# Patient Record
Sex: MALE | Race: WHITE | Employment: OTHER | URBAN - METROPOLITAN AREA
[De-identification: names, ages, dates, MRNs, and addresses within clinical notes are randomized per-mention and may not be internally consistent; named-entity substitution may affect disease eponyms.]

---

## 2023-09-10 ENCOUNTER — APPOINTMENT (OUTPATIENT)
Dept: GENERAL RADIOLOGY | Facility: HOSPITAL | Age: 83
End: 2023-09-10
Attending: EMERGENCY MEDICINE
Payer: MEDICAID

## 2023-09-10 ENCOUNTER — APPOINTMENT (OUTPATIENT)
Dept: CT IMAGING | Facility: HOSPITAL | Age: 83
End: 2023-09-10
Attending: EMERGENCY MEDICINE
Payer: MEDICAID

## 2023-09-10 ENCOUNTER — HOSPITAL ENCOUNTER (INPATIENT)
Facility: HOSPITAL | Age: 83
LOS: 2 days | Discharge: NURSING, REHAB OR LONG-TERM CARE | End: 2023-09-12
Attending: EMERGENCY MEDICINE | Admitting: HOSPITALIST
Payer: MEDICAID

## 2023-09-10 DIAGNOSIS — E86.0 DEHYDRATION: ICD-10-CM

## 2023-09-10 DIAGNOSIS — R11.11 VOMITING WITHOUT NAUSEA, UNSPECIFIED VOMITING TYPE: Primary | ICD-10-CM

## 2023-09-10 DIAGNOSIS — R53.1 WEAKNESS: ICD-10-CM

## 2023-09-10 LAB
ALBUMIN SERPL-MCNC: 3.4 G/DL (ref 3.4–5)
ALBUMIN/GLOB SERPL: 0.9 {RATIO} (ref 1–2)
ALP LIVER SERPL-CCNC: 76 U/L
ALT SERPL-CCNC: 23 U/L
ANION GAP SERPL CALC-SCNC: 8 MMOL/L (ref 0–18)
AST SERPL-CCNC: 32 U/L (ref 15–37)
BASOPHILS # BLD AUTO: 0.03 X10(3) UL (ref 0–0.2)
BASOPHILS NFR BLD AUTO: 0.4 %
BILIRUB SERPL-MCNC: 0.6 MG/DL (ref 0.1–2)
BILIRUB UR QL STRIP.AUTO: NEGATIVE
BUN BLD-MCNC: 15 MG/DL (ref 7–18)
CALCIUM BLD-MCNC: 8.7 MG/DL (ref 8.5–10.1)
CHLORIDE SERPL-SCNC: 104 MMOL/L (ref 98–112)
CLARITY UR REFRACT.AUTO: CLEAR
CO2 SERPL-SCNC: 25 MMOL/L (ref 21–32)
CREAT BLD-MCNC: 0.98 MG/DL
EGFRCR SERPLBLD CKD-EPI 2021: 77 ML/MIN/1.73M2 (ref 60–?)
EOSINOPHIL # BLD AUTO: 0.01 X10(3) UL (ref 0–0.7)
EOSINOPHIL NFR BLD AUTO: 0.1 %
ERYTHROCYTE [DISTWIDTH] IN BLOOD BY AUTOMATED COUNT: 12.3 %
FLUAV + FLUBV RNA SPEC NAA+PROBE: NEGATIVE
FLUAV + FLUBV RNA SPEC NAA+PROBE: NEGATIVE
GLOBULIN PLAS-MCNC: 3.7 G/DL (ref 2.8–4.4)
GLUCOSE BLD-MCNC: 153 MG/DL (ref 70–99)
GLUCOSE UR STRIP.AUTO-MCNC: 500 MG/DL
HCT VFR BLD AUTO: 36.5 %
HGB BLD-MCNC: 12.7 G/DL
IMM GRANULOCYTES # BLD AUTO: 0.04 X10(3) UL (ref 0–1)
IMM GRANULOCYTES NFR BLD: 0.5 %
KETONES UR STRIP.AUTO-MCNC: NEGATIVE MG/DL
LACTATE SERPL-SCNC: 1.2 MMOL/L (ref 0.4–2)
LACTATE SERPL-SCNC: 2.2 MMOL/L (ref 0.4–2)
LEUKOCYTE ESTERASE UR QL STRIP.AUTO: NEGATIVE
LYMPHOCYTES # BLD AUTO: 1.01 X10(3) UL (ref 1–4)
LYMPHOCYTES NFR BLD AUTO: 12.3 %
MCH RBC QN AUTO: 31.6 PG (ref 26–34)
MCHC RBC AUTO-ENTMCNC: 34.8 G/DL (ref 31–37)
MCV RBC AUTO: 90.8 FL
MONOCYTES # BLD AUTO: 0.97 X10(3) UL (ref 0.1–1)
MONOCYTES NFR BLD AUTO: 11.8 %
NEUTROPHILS # BLD AUTO: 6.16 X10 (3) UL (ref 1.5–7.7)
NEUTROPHILS # BLD AUTO: 6.16 X10(3) UL (ref 1.5–7.7)
NEUTROPHILS NFR BLD AUTO: 74.9 %
NITRITE UR QL STRIP.AUTO: NEGATIVE
OSMOLALITY SERPL CALC.SUM OF ELEC: 288 MOSM/KG (ref 275–295)
PH UR STRIP.AUTO: 7 [PH] (ref 5–8)
PLATELET # BLD AUTO: 203 10(3)UL (ref 150–450)
POTASSIUM SERPL-SCNC: 3.2 MMOL/L (ref 3.5–5.1)
PROT SERPL-MCNC: 7.1 G/DL (ref 6.4–8.2)
PROT UR STRIP.AUTO-MCNC: 300 MG/DL
RBC # BLD AUTO: 4.02 X10(6)UL
RBC #/AREA URNS AUTO: >10 /HPF
RSV RNA SPEC NAA+PROBE: NEGATIVE
SARS-COV-2 RNA RESP QL NAA+PROBE: DETECTED
SODIUM SERPL-SCNC: 137 MMOL/L (ref 136–145)
SP GR UR STRIP.AUTO: 1.02 (ref 1–1.03)
UROBILINOGEN UR STRIP.AUTO-MCNC: NORMAL MG/DL
WBC # BLD AUTO: 8.2 X10(3) UL (ref 4–11)

## 2023-09-10 PROCEDURE — 99223 1ST HOSP IP/OBS HIGH 75: CPT | Performed by: HOSPITALIST

## 2023-09-10 PROCEDURE — 71045 X-RAY EXAM CHEST 1 VIEW: CPT | Performed by: EMERGENCY MEDICINE

## 2023-09-10 PROCEDURE — 70450 CT HEAD/BRAIN W/O DYE: CPT | Performed by: EMERGENCY MEDICINE

## 2023-09-10 PROCEDURE — XW033E5 INTRODUCTION OF REMDESIVIR ANTI-INFECTIVE INTO PERIPHERAL VEIN, PERCUTANEOUS APPROACH, NEW TECHNOLOGY GROUP 5: ICD-10-PCS | Performed by: STUDENT IN AN ORGANIZED HEALTH CARE EDUCATION/TRAINING PROGRAM

## 2023-09-10 RX ORDER — NIFEDIPINE 10 MG/1
10 CAPSULE ORAL 3 TIMES DAILY
COMMUNITY

## 2023-09-10 RX ORDER — NIFEDIPINE 30 MG/1
30 TABLET, EXTENDED RELEASE ORAL DAILY PRN
Status: DISCONTINUED | OUTPATIENT
Start: 2023-09-11 | End: 2023-09-10

## 2023-09-10 RX ORDER — METOCLOPRAMIDE HYDROCHLORIDE 5 MG/ML
10 INJECTION INTRAMUSCULAR; INTRAVENOUS EVERY 8 HOURS PRN
Status: DISCONTINUED | OUTPATIENT
Start: 2023-09-10 | End: 2023-09-12

## 2023-09-10 RX ORDER — POLYETHYLENE GLYCOL 3350 17 G/17G
17 POWDER, FOR SOLUTION ORAL DAILY
COMMUNITY

## 2023-09-10 RX ORDER — SODIUM CHLORIDE 9 MG/ML
INJECTION, SOLUTION INTRAVENOUS CONTINUOUS
Status: DISCONTINUED | OUTPATIENT
Start: 2023-09-10 | End: 2023-09-11

## 2023-09-10 RX ORDER — ONDANSETRON 2 MG/ML
4 INJECTION INTRAMUSCULAR; INTRAVENOUS EVERY 6 HOURS PRN
Status: DISCONTINUED | OUTPATIENT
Start: 2023-09-10 | End: 2023-09-12

## 2023-09-10 RX ORDER — OMEPRAZOLE 40 MG/1
40 CAPSULE, DELAYED RELEASE ORAL DAILY
COMMUNITY
Start: 2023-09-05

## 2023-09-10 RX ORDER — ONDANSETRON 2 MG/ML
4 INJECTION INTRAMUSCULAR; INTRAVENOUS ONCE
Status: DISCONTINUED | OUTPATIENT
Start: 2023-09-10 | End: 2023-09-12

## 2023-09-10 RX ORDER — CAPTOPRIL 12.5 MG/1
12.5 TABLET ORAL 3 TIMES DAILY PRN
Status: DISCONTINUED | OUTPATIENT
Start: 2023-09-11 | End: 2023-09-10

## 2023-09-10 RX ORDER — NIFEDIPINE 30 MG/1
30 TABLET, EXTENDED RELEASE ORAL DAILY
Status: DISCONTINUED | OUTPATIENT
Start: 2023-09-11 | End: 2023-09-10

## 2023-09-10 RX ORDER — ENOXAPARIN SODIUM 100 MG/ML
40 INJECTION SUBCUTANEOUS DAILY
Status: DISCONTINUED | OUTPATIENT
Start: 2023-09-10 | End: 2023-09-12

## 2023-09-10 RX ORDER — ACETAMINOPHEN 500 MG
1000 TABLET ORAL ONCE
Status: COMPLETED | OUTPATIENT
Start: 2023-09-10 | End: 2023-09-10

## 2023-09-10 RX ORDER — ABIRATERONE ACETATE 250 MG/1
1000 TABLET ORAL DAILY
COMMUNITY
Start: 2023-08-28

## 2023-09-10 RX ORDER — METOPROLOL SUCCINATE 25 MG/1
25 TABLET, EXTENDED RELEASE ORAL DAILY
Status: DISCONTINUED | OUTPATIENT
Start: 2023-09-11 | End: 2023-09-10

## 2023-09-10 RX ORDER — NIFEDIPINE 30 MG/1
30 TABLET, FILM COATED, EXTENDED RELEASE ORAL DAILY
COMMUNITY
Start: 2023-09-06

## 2023-09-10 RX ORDER — ACETAMINOPHEN 500 MG
500 TABLET ORAL EVERY 4 HOURS PRN
Status: DISCONTINUED | OUTPATIENT
Start: 2023-09-10 | End: 2023-09-12

## 2023-09-10 RX ORDER — METOPROLOL SUCCINATE 50 MG/1
50 TABLET, EXTENDED RELEASE ORAL
Status: DISCONTINUED | OUTPATIENT
Start: 2023-09-11 | End: 2023-09-10

## 2023-09-10 RX ORDER — METOPROLOL SUCCINATE 25 MG/1
25 TABLET, EXTENDED RELEASE ORAL DAILY
COMMUNITY

## 2023-09-10 RX ORDER — POTASSIUM CHLORIDE 20 MEQ/1
40 TABLET, EXTENDED RELEASE ORAL ONCE
Status: COMPLETED | OUTPATIENT
Start: 2023-09-10 | End: 2023-09-10

## 2023-09-10 RX ORDER — PANTOPRAZOLE SODIUM 40 MG/1
40 TABLET, DELAYED RELEASE ORAL
Status: DISCONTINUED | OUTPATIENT
Start: 2023-09-11 | End: 2023-09-12

## 2023-09-10 RX ORDER — VALSARTAN 320 MG/1
160 TABLET ORAL DAILY
Status: DISCONTINUED | OUTPATIENT
Start: 2023-09-11 | End: 2023-09-10

## 2023-09-10 RX ORDER — METOPROLOL SUCCINATE 50 MG/1
50 TABLET, EXTENDED RELEASE ORAL DAILY
COMMUNITY
Start: 2023-04-19

## 2023-09-11 LAB
ANION GAP SERPL CALC-SCNC: 5 MMOL/L (ref 0–18)
BUN BLD-MCNC: 14 MG/DL (ref 7–18)
CALCIUM BLD-MCNC: 7.7 MG/DL (ref 8.5–10.1)
CHLORIDE SERPL-SCNC: 112 MMOL/L (ref 98–112)
CO2 SERPL-SCNC: 25 MMOL/L (ref 21–32)
CREAT BLD-MCNC: 0.71 MG/DL
EGFRCR SERPLBLD CKD-EPI 2021: 91 ML/MIN/1.73M2 (ref 60–?)
ERYTHROCYTE [DISTWIDTH] IN BLOOD BY AUTOMATED COUNT: 12.4 %
GLUCOSE BLD-MCNC: 96 MG/DL (ref 70–99)
HCT VFR BLD AUTO: 34.5 %
HGB BLD-MCNC: 11.6 G/DL
MAGNESIUM SERPL-MCNC: 1.9 MG/DL (ref 1.6–2.6)
MCH RBC QN AUTO: 31.5 PG (ref 26–34)
MCHC RBC AUTO-ENTMCNC: 33.6 G/DL (ref 31–37)
MCV RBC AUTO: 93.8 FL
OSMOLALITY SERPL CALC.SUM OF ELEC: 294 MOSM/KG (ref 275–295)
PLATELET # BLD AUTO: 140 10(3)UL (ref 150–450)
POTASSIUM SERPL-SCNC: 2.9 MMOL/L (ref 3.5–5.1)
POTASSIUM SERPL-SCNC: 2.9 MMOL/L (ref 3.5–5.1)
POTASSIUM SERPL-SCNC: 3.4 MMOL/L (ref 3.5–5.1)
RBC # BLD AUTO: 3.68 X10(6)UL
SODIUM SERPL-SCNC: 142 MMOL/L (ref 136–145)
WBC # BLD AUTO: 4.3 X10(3) UL (ref 4–11)

## 2023-09-11 PROCEDURE — 99233 SBSQ HOSP IP/OBS HIGH 50: CPT | Performed by: HOSPITALIST

## 2023-09-11 RX ORDER — HYDRALAZINE HYDROCHLORIDE 20 MG/ML
10 INJECTION INTRAMUSCULAR; INTRAVENOUS EVERY 6 HOURS PRN
Status: DISCONTINUED | OUTPATIENT
Start: 2023-09-11 | End: 2023-09-12

## 2023-09-11 RX ORDER — NIFEDIPINE 30 MG/1
30 TABLET, EXTENDED RELEASE ORAL DAILY
Status: DISCONTINUED | OUTPATIENT
Start: 2023-09-11 | End: 2023-09-12

## 2023-09-11 RX ORDER — LOSARTAN POTASSIUM 100 MG/1
100 TABLET ORAL DAILY
Status: DISCONTINUED | OUTPATIENT
Start: 2023-09-11 | End: 2023-09-12

## 2023-09-11 RX ORDER — POTASSIUM CHLORIDE 20 MEQ/1
40 TABLET, EXTENDED RELEASE ORAL EVERY 4 HOURS
Status: COMPLETED | OUTPATIENT
Start: 2023-09-11 | End: 2023-09-11

## 2023-09-11 RX ORDER — METOPROLOL TARTRATE 50 MG/1
50 TABLET, FILM COATED ORAL
Status: DISCONTINUED | OUTPATIENT
Start: 2023-09-11 | End: 2023-09-12

## 2023-09-11 RX ORDER — POTASSIUM CHLORIDE 20 MEQ/1
40 TABLET, EXTENDED RELEASE ORAL ONCE
Status: COMPLETED | OUTPATIENT
Start: 2023-09-11 | End: 2023-09-11

## 2023-09-12 VITALS
HEART RATE: 93 BPM | OXYGEN SATURATION: 96 % | DIASTOLIC BLOOD PRESSURE: 107 MMHG | WEIGHT: 185 LBS | RESPIRATION RATE: 18 BRPM | SYSTOLIC BLOOD PRESSURE: 141 MMHG | HEIGHT: 66.93 IN | BODY MASS INDEX: 29.03 KG/M2 | TEMPERATURE: 99 F

## 2023-09-12 LAB
ANION GAP SERPL CALC-SCNC: 4 MMOL/L (ref 0–18)
BASOPHILS # BLD AUTO: 0.02 X10(3) UL (ref 0–0.2)
BASOPHILS NFR BLD AUTO: 0.5 %
BUN BLD-MCNC: 16 MG/DL (ref 7–18)
CALCIUM BLD-MCNC: 8 MG/DL (ref 8.5–10.1)
CHLORIDE SERPL-SCNC: 115 MMOL/L (ref 98–112)
CO2 SERPL-SCNC: 24 MMOL/L (ref 21–32)
CREAT BLD-MCNC: 0.79 MG/DL
EGFRCR SERPLBLD CKD-EPI 2021: 88 ML/MIN/1.73M2 (ref 60–?)
EOSINOPHIL # BLD AUTO: 0.02 X10(3) UL (ref 0–0.7)
EOSINOPHIL NFR BLD AUTO: 0.5 %
ERYTHROCYTE [DISTWIDTH] IN BLOOD BY AUTOMATED COUNT: 12.5 %
GLUCOSE BLD-MCNC: 101 MG/DL (ref 70–99)
HCT VFR BLD AUTO: 32.5 %
HGB BLD-MCNC: 11.4 G/DL
IMM GRANULOCYTES # BLD AUTO: 0.01 X10(3) UL (ref 0–1)
IMM GRANULOCYTES NFR BLD: 0.3 %
LYMPHOCYTES # BLD AUTO: 1.53 X10(3) UL (ref 1–4)
LYMPHOCYTES NFR BLD AUTO: 40.2 %
MAGNESIUM SERPL-MCNC: 2.1 MG/DL (ref 1.6–2.6)
MCH RBC QN AUTO: 31.7 PG (ref 26–34)
MCHC RBC AUTO-ENTMCNC: 35.1 G/DL (ref 31–37)
MCV RBC AUTO: 90.3 FL
MONOCYTES # BLD AUTO: 0.46 X10(3) UL (ref 0.1–1)
MONOCYTES NFR BLD AUTO: 12.1 %
NEUTROPHILS # BLD AUTO: 1.77 X10 (3) UL (ref 1.5–7.7)
NEUTROPHILS # BLD AUTO: 1.77 X10(3) UL (ref 1.5–7.7)
NEUTROPHILS NFR BLD AUTO: 46.4 %
OSMOLALITY SERPL CALC.SUM OF ELEC: 297 MOSM/KG (ref 275–295)
PLATELET # BLD AUTO: 141 10(3)UL (ref 150–450)
POTASSIUM SERPL-SCNC: 3.3 MMOL/L (ref 3.5–5.1)
POTASSIUM SERPL-SCNC: 3.3 MMOL/L (ref 3.5–5.1)
RBC # BLD AUTO: 3.6 X10(6)UL
SODIUM SERPL-SCNC: 143 MMOL/L (ref 136–145)
WBC # BLD AUTO: 3.8 X10(3) UL (ref 4–11)

## 2023-09-12 PROCEDURE — 99239 HOSP IP/OBS DSCHRG MGMT >30: CPT | Performed by: HOSPITALIST

## 2023-09-12 RX ORDER — POTASSIUM CHLORIDE 20 MEQ/1
40 TABLET, EXTENDED RELEASE ORAL ONCE
Status: COMPLETED | OUTPATIENT
Start: 2023-09-12 | End: 2023-09-12

## 2024-10-14 ENCOUNTER — APPOINTMENT (OUTPATIENT)
Dept: CT IMAGING | Facility: HOSPITAL | Age: 84
End: 2024-10-14
Attending: EMERGENCY MEDICINE
Payer: MEDICAID

## 2024-10-14 ENCOUNTER — HOSPITAL ENCOUNTER (INPATIENT)
Facility: HOSPITAL | Age: 84
LOS: 2 days | Discharge: HOME | End: 2024-10-16
Attending: EMERGENCY MEDICINE | Admitting: HOSPITALIST
Payer: MEDICAID

## 2024-10-14 ENCOUNTER — APPOINTMENT (OUTPATIENT)
Dept: GENERAL RADIOLOGY | Facility: HOSPITAL | Age: 84
End: 2024-10-14
Attending: INTERNAL MEDICINE
Payer: MEDICAID

## 2024-10-14 DIAGNOSIS — R07.9 CHEST PAIN OF UNCERTAIN ETIOLOGY: Primary | ICD-10-CM

## 2024-10-14 DIAGNOSIS — I16.1 HYPERTENSIVE EMERGENCY: ICD-10-CM

## 2024-10-14 PROBLEM — R73.9 HYPERGLYCEMIA: Status: ACTIVE | Noted: 2024-10-14

## 2024-10-14 PROBLEM — E87.6 HYPOKALEMIA: Status: ACTIVE | Noted: 2024-10-14

## 2024-10-14 LAB
ALBUMIN SERPL-MCNC: 4.3 G/DL (ref 3.2–4.8)
ALBUMIN/GLOB SERPL: 1.7 {RATIO} (ref 1–2)
ALP LIVER SERPL-CCNC: 94 U/L
ALT SERPL-CCNC: 7 U/L
ANION GAP SERPL CALC-SCNC: 6 MMOL/L (ref 0–18)
AST SERPL-CCNC: 24 U/L (ref ?–34)
ATRIAL RATE: 63 BPM
BASOPHILS # BLD AUTO: 0.06 X10(3) UL (ref 0–0.2)
BASOPHILS NFR BLD AUTO: 0.8 %
BILIRUB SERPL-MCNC: 0.3 MG/DL (ref 0.2–1.1)
BUN BLD-MCNC: 17 MG/DL (ref 9–23)
CALCIUM BLD-MCNC: 9.1 MG/DL (ref 8.7–10.4)
CHLORIDE SERPL-SCNC: 108 MMOL/L (ref 98–112)
CO2 SERPL-SCNC: 26 MMOL/L (ref 21–32)
CREAT BLD-MCNC: 0.93 MG/DL
EGFRCR SERPLBLD CKD-EPI 2021: 81 ML/MIN/1.73M2 (ref 60–?)
EOSINOPHIL # BLD AUTO: 0.15 X10(3) UL (ref 0–0.7)
EOSINOPHIL NFR BLD AUTO: 2 %
ERYTHROCYTE [DISTWIDTH] IN BLOOD BY AUTOMATED COUNT: 12.6 %
GLOBULIN PLAS-MCNC: 2.6 G/DL (ref 2–3.5)
GLUCOSE BLD-MCNC: 129 MG/DL (ref 70–99)
HCT VFR BLD AUTO: 34.9 %
HGB BLD-MCNC: 12.2 G/DL
IMM GRANULOCYTES # BLD AUTO: 0.04 X10(3) UL (ref 0–1)
IMM GRANULOCYTES NFR BLD: 0.5 %
LYMPHOCYTES # BLD AUTO: 1.68 X10(3) UL (ref 1–4)
LYMPHOCYTES NFR BLD AUTO: 22.9 %
MCH RBC QN AUTO: 31.5 PG (ref 26–34)
MCHC RBC AUTO-ENTMCNC: 35 G/DL (ref 31–37)
MCV RBC AUTO: 90.2 FL
MONOCYTES # BLD AUTO: 0.36 X10(3) UL (ref 0.1–1)
MONOCYTES NFR BLD AUTO: 4.9 %
NEUTROPHILS # BLD AUTO: 5.04 X10 (3) UL (ref 1.5–7.7)
NEUTROPHILS # BLD AUTO: 5.04 X10(3) UL (ref 1.5–7.7)
NEUTROPHILS NFR BLD AUTO: 68.9 %
OSMOLALITY SERPL CALC.SUM OF ELEC: 293 MOSM/KG (ref 275–295)
P AXIS: 57 DEGREES
P-R INTERVAL: 182 MS
PLATELET # BLD AUTO: 231 10(3)UL (ref 150–450)
POTASSIUM SERPL-SCNC: 3.4 MMOL/L (ref 3.5–5.1)
PROT SERPL-MCNC: 6.9 G/DL (ref 5.7–8.2)
Q-T INTERVAL: 402 MS
QRS DURATION: 80 MS
QTC CALCULATION (BEZET): 411 MS
R AXIS: 40 DEGREES
RBC # BLD AUTO: 3.87 X10(6)UL
SODIUM SERPL-SCNC: 140 MMOL/L (ref 136–145)
T AXIS: 51 DEGREES
TROPONIN I SERPL HS-MCNC: 12 NG/L
TROPONIN I SERPL HS-MCNC: 12 NG/L
TSI SER-ACNC: 1.47 MIU/ML (ref 0.55–4.78)
VENTRICULAR RATE: 63 BPM
WBC # BLD AUTO: 7.3 X10(3) UL (ref 4–11)

## 2024-10-14 PROCEDURE — 70450 CT HEAD/BRAIN W/O DYE: CPT | Performed by: EMERGENCY MEDICINE

## 2024-10-14 PROCEDURE — 99223 1ST HOSP IP/OBS HIGH 75: CPT | Performed by: INTERNAL MEDICINE

## 2024-10-14 PROCEDURE — 71045 X-RAY EXAM CHEST 1 VIEW: CPT | Performed by: INTERNAL MEDICINE

## 2024-10-14 RX ORDER — BISACODYL 10 MG
10 SUPPOSITORY, RECTAL RECTAL
Status: DISCONTINUED | OUTPATIENT
Start: 2024-10-14 | End: 2024-10-16

## 2024-10-14 RX ORDER — NITROGLYCERIN 0.4 MG/1
TABLET SUBLINGUAL
Status: COMPLETED
Start: 2024-10-14 | End: 2024-10-14

## 2024-10-14 RX ORDER — NITROGLYCERIN 20 MG/100ML
INJECTION INTRAVENOUS CONTINUOUS
Status: DISCONTINUED | OUTPATIENT
Start: 2024-10-14 | End: 2024-10-15

## 2024-10-14 RX ORDER — HYDRALAZINE HYDROCHLORIDE 20 MG/ML
10 INJECTION INTRAMUSCULAR; INTRAVENOUS ONCE
Status: COMPLETED | OUTPATIENT
Start: 2024-10-14 | End: 2024-10-14

## 2024-10-14 RX ORDER — ENOXAPARIN SODIUM 100 MG/ML
40 INJECTION SUBCUTANEOUS DAILY
Status: DISCONTINUED | OUTPATIENT
Start: 2024-10-14 | End: 2024-10-16

## 2024-10-14 RX ORDER — POTASSIUM CHLORIDE 1500 MG/1
40 TABLET, EXTENDED RELEASE ORAL ONCE
Status: COMPLETED | OUTPATIENT
Start: 2024-10-14 | End: 2024-10-14

## 2024-10-14 RX ORDER — METOCLOPRAMIDE HYDROCHLORIDE 5 MG/ML
5 INJECTION INTRAMUSCULAR; INTRAVENOUS EVERY 8 HOURS PRN
Status: DISCONTINUED | OUTPATIENT
Start: 2024-10-14 | End: 2024-10-16

## 2024-10-14 RX ORDER — ZOLPIDEM TARTRATE 5 MG/1
10 TABLET ORAL NIGHTLY PRN
Status: DISCONTINUED | OUTPATIENT
Start: 2024-10-14 | End: 2024-10-16

## 2024-10-14 RX ORDER — ZOLPIDEM TARTRATE 10 MG/1
10 TABLET ORAL NIGHTLY PRN
COMMUNITY

## 2024-10-14 RX ORDER — SENNOSIDES 8.6 MG
17.2 TABLET ORAL NIGHTLY PRN
Status: DISCONTINUED | OUTPATIENT
Start: 2024-10-14 | End: 2024-10-16

## 2024-10-14 RX ORDER — PREDNISONE 5 MG/1
5 TABLET ORAL DAILY
Status: DISCONTINUED | OUTPATIENT
Start: 2024-10-15 | End: 2024-10-16

## 2024-10-14 RX ORDER — SODIUM PHOSPHATE, DIBASIC AND SODIUM PHOSPHATE, MONOBASIC 7; 19 G/230ML; G/230ML
1 ENEMA RECTAL ONCE AS NEEDED
Status: DISCONTINUED | OUTPATIENT
Start: 2024-10-14 | End: 2024-10-16

## 2024-10-14 RX ORDER — ABIRATERONE ACETATE 250 MG/1
1000 TABLET ORAL DAILY
Status: DISCONTINUED | OUTPATIENT
Start: 2024-10-15 | End: 2024-10-16

## 2024-10-14 RX ORDER — ACETAMINOPHEN 500 MG
500 TABLET ORAL EVERY 4 HOURS PRN
Status: DISCONTINUED | OUTPATIENT
Start: 2024-10-14 | End: 2024-10-16

## 2024-10-14 RX ORDER — ASPIRIN 81 MG/1
324 TABLET, CHEWABLE ORAL ONCE
Status: COMPLETED | OUTPATIENT
Start: 2024-10-14 | End: 2024-10-14

## 2024-10-14 RX ORDER — PREDNISONE 5 MG/1
5 TABLET ORAL DAILY
COMMUNITY

## 2024-10-14 RX ORDER — PREDNISONE 5 MG/1
5 TABLET ORAL EVERY EVENING
Status: DISCONTINUED | OUTPATIENT
Start: 2024-10-14 | End: 2024-10-14

## 2024-10-14 RX ORDER — HYDRALAZINE HYDROCHLORIDE 20 MG/ML
10 INJECTION INTRAMUSCULAR; INTRAVENOUS ONCE
Status: DISCONTINUED | OUTPATIENT
Start: 2024-10-14 | End: 2024-10-14

## 2024-10-14 RX ORDER — POLYETHYLENE GLYCOL 3350 17 G/17G
17 POWDER, FOR SOLUTION ORAL DAILY PRN
Status: DISCONTINUED | OUTPATIENT
Start: 2024-10-14 | End: 2024-10-16

## 2024-10-14 RX ORDER — MELATONIN
3 NIGHTLY PRN
Status: DISCONTINUED | OUTPATIENT
Start: 2024-10-14 | End: 2024-10-16

## 2024-10-14 RX ORDER — AMLODIPINE BESYLATE 5 MG/1
5 TABLET ORAL DAILY
Status: DISCONTINUED | OUTPATIENT
Start: 2024-10-14 | End: 2024-10-16

## 2024-10-14 RX ORDER — ONDANSETRON 2 MG/ML
4 INJECTION INTRAMUSCULAR; INTRAVENOUS EVERY 6 HOURS PRN
Status: DISCONTINUED | OUTPATIENT
Start: 2024-10-14 | End: 2024-10-16

## 2024-10-14 RX ORDER — METOPROLOL SUCCINATE 50 MG/1
50 TABLET, EXTENDED RELEASE ORAL DAILY
Status: DISCONTINUED | OUTPATIENT
Start: 2024-10-15 | End: 2024-10-16

## 2024-10-14 RX ORDER — NITROGLYCERIN 0.4 MG/1
0.4 TABLET SUBLINGUAL ONCE
Status: COMPLETED | OUTPATIENT
Start: 2024-10-14 | End: 2024-10-14

## 2024-10-14 NOTE — PLAN OF CARE
Patient was an admit from the ED. He is mandarin speaking. Son and  used to translate. He is a/o x3. Denies any chest pain or shortness of breath. On RA. Cardiac wise he is NSR. On a nitroglycerin drip. Continent. Urinal at bedside. Up x1 with a walker.

## 2024-10-14 NOTE — CONSULTS
[facility]  Cardiology Consultation    Shreya Avelar Patient Status:  Emergency    1940 MRN [id number]   Location [facility] EMERGENCY DEPARTMENT Attending Lars Chapman MD   Hosp Day # 0 PCP LISA Mohamud     Reason for Consultation:  Chest pain    History of Present Illness:  Shreya Avelar is a a(n) 84 year old male who presents with confusion, chest pain.  He is mandarin speaking  Son translating  He has noted elevated high blood pressure for past few months  He has chest pain for 2 days. Has been having pain at home since mid night last night.   Has not been compliant with his medications. Partly because of his insurance.    Has no current chest pain or pressure. No SOB    States his pain is somewhat similar to what he had back when he had his MI    He denies smoking or alcohol intake    He has a remote history of PCI more than 10 years ago. He also has a history of metastatic prostate cancer - follows at Counts include [facility] beds at the [facility].     EKG: NSR, septal infarct  Trop negative x 1    History:  Past Medical History:    BPH (benign prostatic hyperplasia)    Cancer (HCC)    prostate    Essential hypertension    High blood pressure     History reviewed. No pertinent surgical history.  No family history on file.   reports that he has never smoked. He has never used smokeless tobacco. He reports that he does not drink alcohol and does not use drugs.    Allergies:  Allergies[1]    Medications:    Current Facility-Administered Medications:     nitroGLYCERIN in dextrose 5% 50 mg/250mL infusion premix, 5-400 mcg/min, Intravenous, Continuous    Review of Systems:  A comprehensive review of systems was negative if not otherwise mention in above HPI.    BP (!) 201/81   Pulse 63   Temp 97.9 °F (36.6 °C) (Oral)   Resp 18   Ht 5' 6\" (1.676 m)   Wt 186 lb (84.4 kg)   SpO2 99%   BMI 30.02 kg/m²   Temp (24hrs), Av.9 °F (36.6 °C), Min:97.9 °F (36.6 °C), Max:97.9 °F (36.6 °C)     No intake  or output data in the 24 hours ending 10/14/24 1526  Wt Readings from Last 3 Encounters:   10/14/24 186 lb (84.4 kg)   09/10/23 185 lb (83.9 kg)       Physical Exam:   General: Alert and oriented x 3. No apparent distress. No respiratory or constitutional distress.  HEENT: Normocephalic, anicteric sclera, neck supple.  Neck: No JVD,   Cardiac: Regular rate and rhythm. S1, S2 normal. No murmur,  Lungs: Clear without wheezes, rales, rhonchi or dullness.   Abdomen: Soft, non-tender.   Extremities: Without clubbing, cyanosis or edema.    Neurologic: Alert and oriented, normal affect.  Skin: Warm and dry.     Laboratory Data:  Lab Results   Component Value Date    WBC 7.3 10/14/2024    HGB 12.2 10/14/2024    HCT 34.9 10/14/2024    .0 10/14/2024    CREATSERUM 0.93 10/14/2024    BUN 17 10/14/2024     10/14/2024    K 3.4 10/14/2024     10/14/2024    CO2 26.0 10/14/2024     10/14/2024    CA 9.1 10/14/2024    ALB 4.3 10/14/2024    ALKPHO 94 10/14/2024    BILT 0.3 10/14/2024    TP 6.9 10/14/2024    AST 24 10/14/2024    ALT 7 10/14/2024     Recent Labs   Lab 10/14/24  1326   TROPHS 12         Imaging:  Reviewed    Impression:    Hypertensive urgency  Chest pain  History of CAD s/p PCI to left circumflex  Metastatic prostate cancer - under control per family      Recommendations:    He has ruled out for ACS  Will control BP. Add amlodipine 5 mg daily  Cont with metoprolol    Will check echo    Eventual stress test prior to discharge for evaluation of underlying CAD    Thank you for allowing me to participate in the care of your patient.    Amina Singh MD  10/14/2024  3:26 PM         [1] No Known Allergies

## 2024-10-14 NOTE — ED INITIAL ASSESSMENT (HPI)
Patient here with son for nausea and confusion overnight. This morning he started experiencing chest pain.   Hx Htn, MI with stents placed, along with prostate CA.

## 2024-10-14 NOTE — ED QUICK NOTES
Orders for admission, patient is aware of plan and ready to go upstairs. Any questions, please call ED ANGELITO Mora at extension [phone].     Patient Covid vaccination status: Fully vaccinated     COVID Test Ordered in ED: None    COVID Suspicion at Admission: N/A    Running Infusions:    nitroGLYCERIN in dextrose 5% 20 mcg/min (10/14/24 1433)        Mental Status/LOC at time of transport: x4    Other pertinent information: Mandarin speaking  CIWA score: N/A   NIH score:  N/A

## 2024-10-14 NOTE — H&P
[facility]     Shreya Avelar Patient Status:  Emergency    1940 MRN [id number]   [facility] EMERGENCY DEPARTMENT Attending Lars Chapman MD   Hosp Day # 0 PCP LISA Mohamud     Chief Complaint: chest pain, nausea, altered mental status    Subjective:    History of Present Illness:     Shreya Avelar is a 84 year old male with history of CAD s/p PCI, HTN, prostate cancer, Parkinson's disease presented with chest pain, nausea, AMS.     Patient is Mandarin speaking and his son helped translate and also provided history.  Patient has had elevated blood pressures up into the systolics of 200s for weeks to months.  Primary care physician recommended for better blood pressure control about 2 weeks ago.  It is not clear if any medication changes were made at the time.  Patient has been reporting intermittent chest pain for months.  Current episode started at midnight on day of admission.  Patient reports a substernal tightness/pressure that was constant.  Blood pressure is elevated up to 200 and persisted so family decided to bring the patient to the ER for evaluation.  Patient reports associated nausea and chills.  Denies fevers, dysuria, hematuria, cough, weakness, tingling, headache.    BP up to 207/77 in the ED. EKG with NSR and no significant ST-T wave changes c/f ACS. Troponin x 1 negative. Patient was given FD aspirin, IV hydralazine 10 mg, SL nitroglycerin and nitroglycerin drip. Cardiology consulted.     History/Other:    Past Medical History:  Past Medical History:    BPH (benign prostatic hyperplasia)    Cancer (HCC)    prostate    Essential hypertension    High blood pressure     Past Surgical History:   History reviewed. No pertinent surgical history.   Family History:   No family history on file.  Social History:    reports that he has never smoked. He has never used smokeless tobacco. He reports that he does not drink alcohol and does not use  drugs.     Allergies: Allergies[1]    Medications:  Medications Ordered Prior to Encounter[2]    Review of Systems:   A comprehensive review of systems was completed.    Pertinent positives and negatives noted in the HPI.    Objective:   Physical Exam:    BP (!) 196/78   Pulse 68   Temp 97.9 °F (36.6 °C) (Oral)   Resp 17   Ht 5' 6\" (1.676 m)   Wt 186 lb (84.4 kg)   SpO2 99%   BMI 30.02 kg/m²   General: No acute distress, Alert  Respiratory: No rhonchi, no wheezes  Cardiovascular: S1, S2. Regular rate and rhythm  Abdomen: Soft, Non-tender, non-distended, positive bowel sounds  Neuro: No new focal deficits  Extremities: No edema mental status back to baseline per patient's son Darion    Results:    Labs:      Labs Last 24 Hours:    Recent Labs   Lab 10/14/24  1326   RBC 3.87   HGB 12.2*   HCT 34.9*   MCV 90.2   MCH 31.5   MCHC 35.0   RDW 12.6   NEPRELIM 5.04   WBC 7.3   .0       Recent Labs   Lab 10/14/24  1326   *   BUN 17   CREATSERUM 0.93   EGFRCR 81   CA 9.1   ALB 4.3      K 3.4*      CO2 26.0   ALKPHO 94   AST 24   ALT 7*   BILT 0.3   TP 6.9       No results found for: \"PT\", \"INR\"    Recent Labs   Lab 10/14/24  1326   TROPHS 12       No results for input(s): \"TROP\", \"PBNP\" in the last 168 hours.    No results for input(s): \"PCT\" in the last 168 hours.    Imaging: Imaging data reviewed in Epic.    Assessment & Plan:      #Chest pain   #Hypertensive crisis   -Obtain CXR  -Repeat troponin, check TSH   -Continue nitroglycerin drip, starting Norvasc  -Continue home medications  -Cardiology consulted   -Plan for echo and stress test once blood pressures are better controlled    #Altered mental status reported by family   CT brain without acute findings   -Mental status is currently back to baseline per patient's son    #Mild hypokalemia - 2/2 vomiting?  -Supplement per protocol     #CAD with hx of MI  # Prostate cancer-continue home meds  #Parkinson's disease-not on meds  #Obesity, BMI  30    CODE STATUS confirmed DNAR    Plan of care discussed with patient    Kimberlee Malin MD    Supplementary Documentation:     The 21st Century Cures Act makes medical notes like these available to patients in the interest of transparency. Please be advised this is a medical document. Medical documents are intended to carry relevant information, facts as evident, and the clinical opinion of the practitioner. The medical note is intended as peer to peer communication and may appear blunt or direct. It is written in medical language and may contain abbreviations or verbiage that are unfamiliar.                                       [1] No Known Allergies  [2]   No current facility-administered medications on file prior to encounter.     Current Outpatient Medications on File Prior to Encounter   Medication Sig Dispense Refill    Abiraterone Acetate 250 MG Oral Tab Take 1,000 mg by mouth daily.      metoprolol succinate ER 50 MG Oral Tablet 24 Hr Take 1 tablet (50 mg total) by mouth daily.      NIFEdipine ER 30 MG Oral Tablet 24 Hr Take 1 tablet (30 mg total) by mouth daily.

## 2024-10-15 ENCOUNTER — APPOINTMENT (OUTPATIENT)
Dept: CV DIAGNOSTICS | Facility: HOSPITAL | Age: 84
End: 2024-10-15
Payer: MEDICAID

## 2024-10-15 PROBLEM — C79.51 PROSTATE CANCER METASTATIC TO BONE (HCC): Status: ACTIVE | Noted: 2024-10-15

## 2024-10-15 PROBLEM — C61 PROSTATE CANCER METASTATIC TO BONE (HCC): Status: ACTIVE | Noted: 2024-10-15

## 2024-10-15 LAB
ALBUMIN SERPL-MCNC: 3.6 G/DL (ref 3.2–4.8)
ALBUMIN/GLOB SERPL: 1.6 {RATIO} (ref 1–2)
ALP LIVER SERPL-CCNC: 81 U/L
ALT SERPL-CCNC: <7 U/L
ANION GAP SERPL CALC-SCNC: 6 MMOL/L (ref 0–18)
AST SERPL-CCNC: 24 U/L (ref ?–34)
BILIRUB SERPL-MCNC: 0.5 MG/DL (ref 0.2–1.1)
BUN BLD-MCNC: 16 MG/DL (ref 9–23)
CALCIUM BLD-MCNC: 9.1 MG/DL (ref 8.7–10.4)
CHLORIDE SERPL-SCNC: 111 MMOL/L (ref 98–112)
CO2 SERPL-SCNC: 26 MMOL/L (ref 21–32)
CREAT BLD-MCNC: 0.75 MG/DL
EGFRCR SERPLBLD CKD-EPI 2021: 89 ML/MIN/1.73M2 (ref 60–?)
ERYTHROCYTE [DISTWIDTH] IN BLOOD BY AUTOMATED COUNT: 12.6 %
GLOBULIN PLAS-MCNC: 2.3 G/DL (ref 2–3.5)
GLUCOSE BLD-MCNC: 117 MG/DL (ref 70–99)
HCT VFR BLD AUTO: 31.5 %
HGB BLD-MCNC: 11 G/DL
MAGNESIUM SERPL-MCNC: 2 MG/DL (ref 1.6–2.6)
MCH RBC QN AUTO: 31.6 PG (ref 26–34)
MCHC RBC AUTO-ENTMCNC: 34.9 G/DL (ref 31–37)
MCV RBC AUTO: 90.5 FL
OSMOLALITY SERPL CALC.SUM OF ELEC: 298 MOSM/KG (ref 275–295)
PHOSPHATE SERPL-MCNC: 2.9 MG/DL (ref 2.4–5.1)
PLATELET # BLD AUTO: 202 10(3)UL (ref 150–450)
POTASSIUM SERPL-SCNC: 3.3 MMOL/L (ref 3.5–5.1)
POTASSIUM SERPL-SCNC: 3.3 MMOL/L (ref 3.5–5.1)
PROT SERPL-MCNC: 5.9 G/DL (ref 5.7–8.2)
PSA SERPL-MCNC: 26.83 NG/ML (ref ?–4)
RBC # BLD AUTO: 3.48 X10(6)UL
SODIUM SERPL-SCNC: 143 MMOL/L (ref 136–145)
WBC # BLD AUTO: 7.7 X10(3) UL (ref 4–11)

## 2024-10-15 PROCEDURE — 99255 IP/OBS CONSLTJ NEW/EST HI 80: CPT | Performed by: INTERNAL MEDICINE

## 2024-10-15 PROCEDURE — 99232 SBSQ HOSP IP/OBS MODERATE 35: CPT | Performed by: INTERNAL MEDICINE

## 2024-10-15 PROCEDURE — 93307 TTE W/O DOPPLER COMPLETE: CPT

## 2024-10-15 RX ORDER — HYDRALAZINE HYDROCHLORIDE 20 MG/ML
10 INJECTION INTRAMUSCULAR; INTRAVENOUS ONCE
Status: COMPLETED | OUTPATIENT
Start: 2024-10-15 | End: 2024-10-15

## 2024-10-15 RX ORDER — POTASSIUM CHLORIDE 1500 MG/1
40 TABLET, EXTENDED RELEASE ORAL ONCE
Status: COMPLETED | OUTPATIENT
Start: 2024-10-15 | End: 2024-10-15

## 2024-10-15 RX ORDER — LOSARTAN POTASSIUM 50 MG/1
50 TABLET ORAL DAILY
Status: DISCONTINUED | OUTPATIENT
Start: 2024-10-15 | End: 2024-10-16

## 2024-10-15 NOTE — PLAN OF CARE
Patient A&O but Mandarin speaking only.  No c/o pain, room air.  Patient's son wanted minimal visits to his dad through the night, reminded him we needed to take vitals through the night, but assured we would leave patient alone unless needed to see him.  Patient currently on Nitro gtt at 20mcg/hr, attempted to titrate lower but SBP started to increase above 160 x2 times.  Patient's oral chemo med, Abiraterone, on hold until Oncology sees patient today, son is aware and cleared with family oncologist that it is OK to miss some doses over next couple days.      BP cuff not working correctly this morning, switched cuffs and getting better readings.

## 2024-10-15 NOTE — CONSULTS
Hematology/Oncology Initial Consultation Note    Patient Name: Shreya Avelar  Medical Record Number: [id number]    YOB: 1940   Date of Consultation: 10/15/2024   Physician requesting consultation: Dr Malin    Reason for Consultation:  Shreya Avelar was seen today for the diagnosis of prostate cancer    History of Present Illness:      83 y/o M PMH CAD with hx of MI, metastatic prostate cancer who was admitted for confusion and chest pain    - prostate biopsy 12/1/2021 with grade gorup 5 prostate cancer in 6 of 12 scores, [place] 9. At that time no evidence of metastatic disease; pt opted to watch and wait  - 2/2023 bone scan with evidence of bone metastases. Started abiraterone + prednisone with lowering PSA  - however recent PSAs increasing, 0.61 in 5/13/24 to 1.8 in 8/5/24  - was reportedly confused, had chest pain, hypertensive, admitted from ED on nitroglycerin drip  - spoke to pt in Mandarin - he is completely oriented. Said he never had chest pain, had a headache at the back. Called son on speakerphone who notes his dad is back to baseline. Pt endorses \"not feeling comfortable\" at the back of his head  - has appt with Dr Garcia, his oncologist on 10/28  - says has been adherent to his abiraterone + prednisone       Past Medical History:  Past Medical History:    BPH (benign prostatic hyperplasia)    Cancer (HCC)    prostate    Essential hypertension    High blood pressure       History reviewed. No pertinent surgical history.    Home Medications:  Medications Ordered Prior to Encounter[1]    Current Inpatient Medications:  Inpatient Meds:   losartan  50 mg Oral Daily    enoxaparin  40 mg Subcutaneous Daily    amLODIPine  5 mg Oral Daily    Abiraterone Acetate  1,000 mg Oral Daily    metoprolol succinate ER  50 mg Oral Daily    predniSONE  5 mg Oral Daily      nitroGLYCERIN in dextrose 5% 20 mcg/min (10/15/24 0300)       PRN Meds:    acetaminophen    melatonin    ondansetron    metoclopramide     polyethylene glycol (PEG 3350)    sennosides    bisacodyl    fleet enema    zolpidem    Allergies:   Allergies[2]    Psychosocial History:  Social History     Social History Narrative    Not on file     Social History     Socioeconomic History    Marital status:    Tobacco Use    Smoking status: Never    Smokeless tobacco: Never   Vaping Use    Vaping status: Never Used   Substance and Sexual Activity    Alcohol use: Never    Drug use: Never     Social Drivers of Health     Food Insecurity: No Food Insecurity (10/14/2024)    Food Insecurity     Food Insecurity: Never true   Transportation Needs: No Transportation Needs (10/14/2024)    Transportation Needs     Lack of Transportation: No   Housing Stability: Low Risk  (10/14/2024)    Housing Stability     Housing Instability: No       Family Medical History:  History reviewed. No pertinent family history.    Review of Systems:  A 10-point ROS was done with pertinent positives and negative per the HPI    Vital Signs:  Height: 167.6 cm (5' 6\") (10/14 1326)  Weight: 84.4 kg (186 lb) (10/14 1749)  BSA (Calculated - sq m): 1.94 sq meters (10/14 1326)  Pulse: 72 (10/15 0700)  BP: 159/76 (10/15 0700)  Temp: 98.7 °F (37.1 °C) (10/15 0534)  Do Not Use - Resp Rate: --  SpO2: 94 % (10/15 0534)      Wt Readings from Last 6 Encounters:   10/14/24 84.4 kg (186 lb)   09/10/23 83.9 kg (185 lb)       Physical Examination:  General: Patient is alert and oriented, not in acute distress  Psych: Mood and affect are appropriate  Eyes: EOMI, PERRL  ENT: Oropharynx is clear, no adenopathy  CV: no LE edema  Respiratory: Non-labored respirations  GI/Abd: Soft, non-tender  Neurological: Grossly intact   Skin: no rashes or petechiae    Laboratory:  Recent Labs   Lab 10/14/24  1326 10/15/24  0546   WBC 7.3 7.7   HGB 12.2* 11.0*   HCT 34.9* 31.5*   .0 202.0   MCV 90.2 90.5   RDW 12.6 12.6   NEPRELIM 5.04  --        Recent Labs   Lab 10/14/24  1326 10/15/24  0546    143   K 3.4*  3.3*  3.3*    111   CO2 26.0 26.0   BUN 17 16   CREATSERUM 0.93 0.75   * 117*   CA 9.1 9.1   PHOS  --  2.9   TP 6.9 5.9   ALB 4.3 3.6   ALKPHO 94 81   AST 24 24   ALT 7* <7*   BILT 0.3 0.5       No results for input(s): \"PT\", \"INR\", \"PTT\", \"FIB\" in the last 168 hours.    Impression & Plan:     Hypertensive crisis  CAD  - BP much better controlled this AM; mgt per cardiology/primary    Confusion  - may have been due to hypertensive crisis    Metastatic prostate cancer  - restart abiraterone. Continue prednisone  - discussed with pt and his son his increase in PSA this morning to 26. Discussed likely disease progression, to follow up with his oncologist Dr Garcia about next steps    Will sign off. Pt to follow up with his outpt oncologist after discharge.    Xavier Stewart MD  Hematology/Medical Oncology  [facility]           [1]   No current facility-administered medications on file prior to encounter.     Current Outpatient Medications on File Prior to Encounter   Medication Sig Dispense Refill    zolpidem 10 MG Oral Tab Take 1 tablet (10 mg total) by mouth nightly as needed for Sleep.      TELMISARTAN OR Take 1 tablet by mouth once daily.      predniSONE 5 MG Oral Tab Take 1 tablet (5 mg total) by mouth daily.      Abiraterone Acetate 250 MG Oral Tab Take 1,000 mg by mouth daily.      metoprolol succinate ER 50 MG Oral Tablet 24 Hr Take 1 tablet (50 mg total) by mouth daily.      NIFEdipine ER 30 MG Oral Tablet 24 Hr Take 1 tablet (30 mg total) by mouth daily.     [2] No Known Allergies

## 2024-10-15 NOTE — PROGRESS NOTES
Progress Note  Shreya Avelar Patient Status:  Inpatient    1940 MRN [id number]   Location [facility] 2NE-A Attending Mickey Queen MD   Hosp Day # 1 PCP LISA Mohamud     Subjective:  Awake. Son at bedside; translating for the patient. No new CV complaints. Denies chest pain currently.     Long discussion about CAD work up and BP meds compliance.     Objective:  /66 (BP Location: Left arm)   Pulse 78   Temp 98.3 °F (36.8 °C) (Oral)   Resp 20   Ht 5' 6\" (1.676 m)   Wt 186 lb (84.4 kg)   SpO2 94%   BMI 30.02 kg/m²     Telemetry: SR       Intake/Output:    Intake/Output Summary (Last 24 hours) at 10/15/2024 09  Last data filed at 10/15/2024 0312  Gross per 24 hour   Intake 240 ml   Output 650 ml   Net -410 ml       Last 3 Weights   10/14/24 1749 186 lb (84.4 kg)   10/14/24 1326 186 lb (84.4 kg)   09/10/23 0924 185 lb (83.9 kg)       Labs:  Recent Labs   Lab 10/14/24  1326 10/15/24  0546   * 117*   BUN 17 16   CREATSERUM 0.93 0.75   EGFRCR 81 89   CA 9.1 9.1    143   K 3.4* 3.3*  3.3*    111   CO2 26.0 26.0     Recent Labs   Lab 10/14/24  1326 10/15/24  0546   RBC 3.87 3.48*   HGB 12.2* 11.0*   HCT 34.9* 31.5*   MCV 90.2 90.5   MCH 31.5 31.6   MCHC 35.0 34.9   RDW 12.6 12.6   NEPRELIM 5.04  --    WBC 7.3 7.7   .0 202.0         Recent Labs   Lab 10/14/24  1326 10/14/24  1733   TROPHS 12 12   EKG:  10/14/24:   SR 67 BPM, QRS;  80 ms, QTc: 411 ms, NH: 182 ms.   Echo:  10/15/24:  Pending.     Review of Systems:   Constitutional: No fevers, chills, fatigue or night sweats.  ENT: No mouth pain, neck pain, running nose, headaches or swollen glands.  Skin: No rashes, pruritus or skin changes,  Respiratory: Denies cough, wheezing or shortness of breath.  CV: Denies chest pain, palpitations, orthopnea, PND or dizziness.  Musculoskeletal: No joint pain, stiffness or swelling.  GI: No nausea, vomiting or diarrhea. No blood in stools.  Neurologic: No seizures,  tremors, weakness or numbness.     Physical Exam:  Gen: alert, oriented x 3, NAD  Heent: pupils equal, reactive. Mucous membranes moist.   Neck: no jvd  Cardiac: regular rate and rhythm, normal S1,S2, no murmur, gallop or rub.   Lungs: CTA  Abd: soft, NT/ND +bs  Ext: no edema  Skin: Warm, dry  Neuro: No focal deficits      Medications:     losartan  50 mg Oral Daily    potassium chloride  40 mEq Oral Once    enoxaparin  40 mg Subcutaneous Daily    amLODIPine  5 mg Oral Daily    Abiraterone Acetate  1,000 mg Oral Daily    metoprolol succinate ER  50 mg Oral Daily    predniSONE  5 mg Oral Daily      nitroGLYCERIN in dextrose 5% 20 mcg/min (10/15/24 0300)       Assessment:  Hypertensive urgency- Initially 207/77.  Bps improved with addition of amlodipine   Losartan 50 mg daily Toprol xl 50 mg daily, and amlodipine 5 mg daily   Chest pain   Trop neg.   Weaned Off nitro  Echo pending   No further anginal symptoms.   3.    History of CAD s/p PCI to left circumflex  Asa; not on statin.   4.    Metastatic prostate cancer - oncology following   prostate biopsy 12/1/2021 with grade gorup 5 prostate cancer in 6 of 12 scores, [place] 9   2/2023 bone scan with evidence of bone metastases. Started abiraterone + prednisone.  PSAs increasing, 0.61 in 5/24 > 1.8 in 8/24 > 26.83 on admission     Plan:  BP stable. Continue same BP regimen for now. Systolic 150.   Continue to monitor BP closely.   Echo today.   Stress test tomorrow for evaluation of underlying CAD   Anticipate discharge tomorrow, if stress test normal.     Plan of care discussed with patient's son and RN.      LISA Perdomo  10/15/2024  9:06 AM  [phone]    I have personally seen and examined patient.   I have independently formulated assessment and plan  I agree with the AP note    Denies CP or pressure  Discussed with son at bedside.   Will check echo today  Cont with oral regiment  Wean off nitroglycerin gtt  Plan of stress test tomorrow    Cont to  follow     Thank you for the opportunity to participate in the care of your patient.     Amina Singh MD  Interventional Cardiologist  [facility]

## 2024-10-15 NOTE — PAYOR COMM NOTE
--------------  ADMISSION REVIEW     Payor: KAITLYN  Subscriber #:  [id number]  Authorization Number: [id number]    Admit date: 10/14/24  Admit time:  4:53 PM       History   HPI  Patient is an 84-year-old male presents with his son.  He is mandrin speaking.  Offered  the son preferred to translate.  Patient has a history of high blood pressure as well as coronary disease status post stenting.  Per son and patient, patient blood pressure was quite elevated at home.  Started to have tightness in his chest.  Became somewhat confused last night.  Was diaphoretic and weak.  Similar to previous diagnosis of heart disease.  Patient has not been taking his medications secondary to financial reasons.  Blood pressure elevated on arrival here.  Complains of chest tightness.  No other specific complaints.  History of prostate cancer.  ED Triage Vitals [10/14/24 1326]   BP (!) 197/101   Pulse 63   Resp 17   Temp 97.9 °F (36.6 °C)   Temp src Oral   SpO2 97 %   O2 Device None (Room air)   HEENT: Normal cephalic atraumatic.  Nonicteric sclera.  Moist mucous membranes.  No meningismus.  No adenopathy  Lungs: No tachypnea.  Lungs clear to auscultation bilaterally without rales/rhonchi.  Equal breath sounds bilaterally  Cardiac: No tachycardia.  No murmurs.  Regular rate and rhythm.  Abdomen: Soft and nontender throughout.  No rebound or guarding  Extremities: No clubbing/cyanosis/edema.  Skin: No rashes, no pallor  Neuro: Awake oriented ×3.  Nonfocal.  Good strength throughout  Labs Reviewed   CBC WITH DIFFERENTIAL WITH PLATELET - Abnormal; Notable for the following components:       Result Value    HGB 12.2 (*)     HCT 34.9 (*)     All other components within normal limits   COMP METABOLIC PANEL (14) - Abnormal; Notable for the following components:    Glucose 129 (*)     Potassium 3.4 (*)     ALT 7 (*)    EKG    Rate, intervals and axes as noted on EKG Report.  Rate: 63  Rhythm: Sinus Rhythm  Reading: Poor R wave  progression.  No acute ST-T wave changes.  Axis/intervals are noted.  Otherwise, agree with EKG report  Disposition and Plan     Clinical Impression:  1. Chest pain of uncertain etiology    2. Hypertensive emergency       Disposition:  Admit  10/14/2024  3:50 pm           History and Physical   History of Present Illness:   Shreya Avelar is a 84 year old male with history of CAD s/p PCI, HTN, prostate cancer, Parkinson's disease presented with chest pain, nausea, AMS.      Patient is Mandarin speaking and his son helped translate and also provided history.  Patient has had elevated blood pressures up into the systolics of 200s for weeks to months.  Primary care physician recommended for better blood pressure control about 2 weeks ago.  It is not clear if any medication changes were made at the time.  Patient has been reporting intermittent chest pain for months.  Current episode started at midnight on day of admission.  Patient reports a substernal tightness/pressure that was constant.  Blood pressure is elevated up to 200 and persisted so family decided to bring the patient to the ER for evaluation.  Patient reports associated nausea and chills.  Denies fevers, dysuria, hematuria, cough, weakness, tingling, headache.     BP up to 207/77 in the ED. EKG with NSR and no significant ST-T wave changes c/f ACS. Troponin x 1 negative. Patient was given FD aspirin, IV hydralazine 10 mg, SL nitroglycerin and nitroglycerin drip. Cardiology consulted.      Lab 10/14/24  1326   RBC 3.87   HGB 12.2*   HCT 34.9*   MCV 90.2   MCH 31.5   MCHC 35.0   RDW 12.6   NEPRELIM 5.04   WBC 7.3   .0      Lab 10/14/24  1326   *   BUN 17   CREATSERUM 0.93   EGFRCR 81   CA 9.1   ALB 4.3      K 3.4*      CO2 26.0   ALKPHO 94   AST 24   ALT 7*   BILT 0.3   TP 6.9      Lab 10/14/24  1326   TROPHS 12    Assessment & Plan:       #Chest pain   #Hypertensive crisis   -Obtain CXR  -Repeat troponin, check TSH   -Continue  nitroglycerin drip, starting Norvasc  -Continue home medications  -Cardiology consulted   -Plan for echo and stress test once blood pressures are better controlled     #Altered mental status reported by family   CT brain without acute findings   -Mental status is currently back to baseline per patient's son     #Mild hypokalemia - 2/2 vomiting?  -Supplement per protocol      #CAD with hx of MI  # Prostate cancer-continue home meds  #Parkinson's disease-not on meds  #Obesity, BMI 30         10/15/24  Temp:  [97.9 °F (36.6 °C)-99 °F (37.2 °C)] 98.1 °F (36.7 °C)  Pulse:  [56-85] 78  Resp:  [13-21] 20  BP: (141-219)/() 150/66  SpO2:  [94 %-99 %] 96 %     Physical Exam:    Respiratory: No wheezes, no rhonchi  Cardiovascular: S1, S2, regular rate and rhythm  Abdomen: Soft, Non-tender, non-distended, positive bowel sounds  Neuro: No new focal deficits.   Extremities: No edema     Lab 10/14/24  1326 10/15/24  0546   WBC 7.3 7.7   HGB 12.2* 11.0*   MCV 90.2 90.5   .0 202.0      Lab 10/14/24  1326 10/15/24  0546   * 117*   BUN 17 16   CREATSERUM 0.93 0.75   CA 9.1 9.1   ALB 4.3 3.6    143   K 3.4* 3.3*  3.3*    111   CO2 26.0 26.0   ALKPHO 94 81   AST 24 24   ALT 7* <7*   BILT 0.3 0.5   TP 6.9 5.9      Lab 10/14/24  1326 10/14/24  1733   TROPHS 12 12     Assessment & Plan:    #Hypertensive urgency   #Chest pain, likely d/t above  - EKG negative for acute ischemic changes  - serial troponin negative  - telemetry  - echo ordered  - started on amlodipine 5mg, metoprolol 50mg  - add losartan 50mg daily  - discontinue nitroglycerin drip  - Cardiology following > possible stress testing tomorrow pending BP management      #Altered mental status reported by family   - CT brain without acute findings   - resolved     #hypokalemia  - replace  - follow BMP     #CAD with hx of MI  - BB  - unclear why he isn't on anti-platelet medications     # Prostate cancer  - PTA abiraterone, prednisone  - oncology  following     #Parkinson's disease  - not on meds     #Obesity  - Body mass index is 30.02 kg/m².     #Chronic anemia  - Hgb at baseline       CARDIOLOGY  Assessment:  Hypertensive urgency- Initially 207/77.  Bps improved with addition of amlodipine   Losartan 50 mg daily Toprol xl 50 mg daily, and amlodipine 5 mg daily   Chest pain   Trop neg.   Weaned Off nitro  Echo pending   No further anginal symptoms.   3.    History of CAD s/p PCI to left circumflex  Asa; not on statin.   4.    Metastatic prostate cancer - oncology following   prostate biopsy 12/1/2021 with grade gorup 5 prostate cancer in 6 of 12 scores, Yohana 9   2/2023 bone scan with evidence of bone metastases. Started abiraterone + prednisone.  PSAs increasing, 0.61 in 5/24 > 1.8 in 8/24 > 26.83 on admission      Plan:  BP stable. Continue same BP regimen for now. Systolic 150.   Continue to monitor BP closely.   Echo today.   Stress test tomorrow for evaluation of underlying CAD     MEDICATIONS ADMINISTERED IN LAST 1 DAY:  amLODIPine (Norvasc) tab 5 mg       Date Action Dose Route User    10/15/2024 0913 Given 5 mg Oral Allison Steinberg RN    10/14/2024 1629 Given 5 mg Oral Francheska Fuentes RN          hydrALAzine (Apresoline) 20 mg/mL injection 10 mg       Date Action Dose Route User    10/14/2024 1629 Given 10 mg Intravenous Francheska Fuentes RN          losartan (Cozaar) tab 50 mg       Date Action Dose Route User    10/15/2024 0913 Given 50 mg Oral Allison Steinberg RN          metoprolol succinate ER (Toprol XL) 24 hr tab 50 mg       Date Action Dose Route User    10/15/2024 0913 Given 50 mg Oral Allison Steinberg RN          nitroGLYCERIN in dextrose 5% 50 mg/250mL infusion premix       Date Action Dose Route User    10/15/2024 0300 Rate/Dose Change 20 mcg/min Intravenous Oj Jaime RN    10/14/2024 1921 Rate/Dose Change 15 mcg/min Intravenous Oj Jaime RN          potassium chloride (Klor-Con M20) tab 40 mEq       Date Action Dose  Route User    10/14/2024 1806 Given 40 mEq Oral Allison Steinberg RN          potassium chloride (Klor-Con M20) tab 40 mEq       Date Action Dose Route User    10/15/2024 0913 Given 40 mEq Oral Allison Steinberg RN          predniSONE (Deltasone) tab 5 mg       Date Action Dose Route User    10/15/2024 0912 Given 5 mg Oral Allison Steinberg RN            Vitals (last day)       Date/Time Temp Pulse Resp BP SpO2 Weight O2 Device O2 Flow Rate (L/min) [facility]    10/15/24 1148 98.1 °F (36.7 °C) -- 20 -- 96 % -- None (Room air) -- RY    10/15/24 0810 98.3 °F (36.8 °C) 78 20 150/66 -- -- None (Room air) -- RY    10/15/24 0700 -- 72 -- 159/76 -- -- -- -- MS    10/15/24 0534 98.7 °F (37.1 °C) 73 18 177/72 94 % -- -- -- SH    10/15/24 0312 -- 74 -- 166/80 -- -- -- -- SH    10/14/24 2308 98.8 °F (37.1 °C) 80 16 169/85 -- -- -- -- SH    10/14/24 1853 99 °F (37.2 °C) 85 18 141/68 95 % -- None (Room air) -- NB    10/14/24 1749 -- -- -- -- -- 186 lb (84.4 kg) -- -- NR    10/14/24 1630 -- 70 15 188/78 97 % -- None (Room air) -- BR    10/14/24 1530 -- 68 17 196/78 99 % -- None (Room air) -- BR    10/14/24 1515 -- 63 18 201/81 99 % -- None (Room air) -- BR    10/14/24 1450 -- 57 14 206/85 97 % -- -- -- ET    10/14/24 1445 -- 58 14 202/84 98 % -- -- -- ET    10/14/24 1430 -- 62 16 193/81 98 % -- -- -- ET    10/14/24 1415 -- 56 15 198/82 98 % -- -- -- ET    10/14/24 1410 -- 57 19 207/77 98 % -- None (Room air) -- ET    10/14/24 1400 -- 60 13 219/84 98 % -- None (Room air) -- ET    10/14/24 1336 -- 62 21 189/70 97 % -- None (Room air) -- ET    10/14/24 1326 97.9 °F (36.6 °C) 63 17 197/101 97 % 186 lb (84.4 kg) None (Room air) -- ET

## 2024-10-15 NOTE — CM/SW NOTE
Pt discussed in rounds and chart was reviewed. Per RN. Pt will have an echo today and a stress test tomorrow.    KENYETTA noted and acknowledged consult order for POLST.    KENYETTA printed form and added pt's information. KENYETTA provided form to RN to give to MD. Form to be completed by MD with pt.     to remain available for support and/or discharge planning.    Addendum: Pt's son had questions regarding pt's code status and POLST form. KENYETTA PerfectServed hospitalist, Dr. Brittani Acuna, and notified her of son's concerns. KENYETTA requested for Dr. Acuna to follow up with pt and family regarding code status and to finish completing POLST form with pt and family.    Cynthia Chun, \[facility] [place] [facility]\""  Discharge Planner  [phone]

## 2024-10-15 NOTE — PHYSICAL THERAPY NOTE
PHYSICAL THERAPY EVALUATION - INPATIENT     Room Number: 2609/2609-A  Evaluation Date: 10/15/2024  Type of Evaluation: Initial  Physician Order: PT Eval and Treat    Presenting Problem: CP  Co-Morbidities : CAD, MI, mets prostate CA  Reason for Therapy: Mobility Dysfunction and Discharge Planning    PHYSICAL THERAPY ASSESSMENT   Patient is a 84 year old male admitted 10/14/2024 for CP.   Patient is currently functioning near baseline with bed mobility, transfers, and gait. Prior to admission, patient's baseline is mod I/SBA in his bed mobilities, transfers and gt. Amb with RW and cane PTA. Wife assist with dressing, meals complex needs. Goes to senior activities regularly.     Patient will benefit from continued skilled PT Services at discharge to promote prior level of function.  Anticipate patient will return home with home health PT.    PLAN  Patient has been evaluated and presents with no skilled Physical Therapy needs at this time.  Patient discharged from Physical Therapy services.  Please re-order if a new functional limitation presents during this admission.    PT Device Recommendation: Rolling walker    GOALS  Patient was able to achieve the following goals ...    Patient was able to transfer At previous, functional level   Patient able to ambulate on level surfaces At previous, functional level     HOME SITUATION  Type of Home: Apartment  Home Layout: One level           Stairs to Bedroom: 0         Lives With: Spouse    Drives: No   Patient Regularly Uses: Cane;Rolling walker (gos to the senior center regularly. wife assist as neded at home e.g. tying shoes. walks with mod I PTA)     Prior Level of [place]: Prior to admission, patient's baseline is mod I/SBA in his bed mobilities, transfers and gt. Amb with RW and cane PTA. Wife assist with dressing, meals complex needs. Goes to senior activities regularly.     SUBJECTIVE  OK, he is back to his baseline, per son    OBJECTIVE     Fall Risk: High fall  risk    WEIGHT BEARING RESTRICTION     PAIN ASSESSMENT  Ratin          COGNITION  Overall Cognitive Status:  WFL - within functional limits    RANGE OF MOTION AND STRENGTH ASSESSMENT  Upper extremity ROM and strength are within functional limits     Lower extremity ROM is within functional limits     Lower extremity strength is within functional limits     BALANCE  Static Sitting: Good  Dynamic Sitting: Good  Static Standing: Fair  Dynamic Standing: Fair -      ACTIVITY TOLERANCE; Good        AM-PAC '6-Clicks' INPATIENT SHORT FORM - BASIC MOBILITY  How much difficulty does the patient currently have...  Patient Difficulty: Turning over in bed (including adjusting bedclothes, sheets and blankets)?: None   Patient Difficulty: Sitting down on and standing up from a chair with arms (e.g., wheelchair, bedside commode, etc.): A Little   Patient Difficulty: Moving from lying on back to sitting on the side of the bed?: None   How much help from another person does the patient currently need...   Help from Another: Moving to and from a bed to a chair (including a wheelchair)?: A Little   Help from Another: Need to walk in hospital room?: A Little   Help from Another: Climbing 3-5 steps with a railing?: A Little       AM-PAC Score:  Raw Score: 20   Approx Degree of Impairment: 35.83%   Standardized Score (AM-PAC Scale): 47.67   CMS Modifier (G-Code): CJ    FUNCTIONAL ABILITY STATUS  Gait Assessment   Functional Mobility/Gait Assessment  Gait Assistance: Supervision  Distance (ft): 200  Assistive Device: Rolling walker    Skilled Therapy Provided     Bed Mobility:  Rolling: mod I  Supine to sit: mod I   Sit to supine: NT     Transfer Mobility:  Sit to stand: SBA/mod I   Stand to sit: mod I  Gait = SBA with RW as support    Therapist's comments:  Mobilities as above  Son at bedside translating  No complaints, No SOB with ambulation. Likely at baseline level of functional skills as conformed with son  Recommend RW at this  time to max functional skills and safety while in hosp  Left on the recliner and made comfortable  Nursing is aware of this visit    Exercise/Education Provided:  Functional activity tolerated  Gait training    Patient End of Session: Up in chair;Needs met;Call light within reach;RN aware of session/findings;All patient questions and concerns addressed;Family present    Patient Evaluation Complexity Level:  History Moderate - 1 or 2 personal factors and/or co-morbidities   Examination of body systems Low -  addressing 1-2 elements   Clinical Presentation Low- Stable   Clinical Decision Making Low Complexity       PT Session Time: 20 minutes  Gait Training: 10 minutes  Therapeutic Activity: 5 minutes

## 2024-10-15 NOTE — PLAN OF CARE
Assumed care of patient at 0730. Patient is mandarin speaking. Son and  needed. A/ox3. On Ra. No pain or shortness of breath. Continent. Upx1 with a walker. Son was updated on POC- present during rounds. No further questions or concerns.         Problem: CARDIOVASCULAR - ADULT  Goal: Maintains optimal cardiac output and hemodynamic stability  Description: INTERVENTIONS:  - Monitor vital signs, rhythm, and trends  - Monitor for bleeding, hypotension and signs of decreased cardiac output  - Evaluate effectiveness of vasoactive medications to optimize hemodynamic stability  - Monitor arterial and/or venous puncture sites for bleeding and/or hematoma  - Assess quality of pulses, skin color and temperature  - Assess for signs of decreased coronary artery perfusion - ex. Angina  - Evaluate fluid balance, assess for edema, trend weights  Outcome: Progressing  Goal: Absence of cardiac arrhythmias or at baseline  Description: INTERVENTIONS:  - Continuous cardiac monitoring, monitor vital signs, obtain 12 lead EKG if indicated  - Evaluate effectiveness of antiarrhythmic and heart rate control medications as ordered  - Initiate emergency measures for life threatening arrhythmias  - Monitor electrolytes and administer replacement therapy as ordered  Outcome: Progressing

## 2024-10-15 NOTE — ED PROVIDER NOTES
Patient Seen in: [facility]      History     Chief Complaint   Patient presents with    Hypertension    Confusion     Stated Complaint: HTN and confusion    Subjective:   HPI      Patient is an 84-year-old male presents with his son.  He is mandrin speaking.  Offered  the son preferred to translate.  Patient has a history of high blood pressure as well as coronary disease status post stenting.  Per son and patient, patient blood pressure was quite elevated at home.  Started to have tightness in his chest.  Became somewhat confused last night.  Was diaphoretic and weak.  Similar to previous diagnosis of heart disease.  Patient has not been taking his medications secondary to financial reasons.  Blood pressure elevated on arrival here.  Complains of chest tightness.  No other specific complaints.  History of prostate cancer.    Objective:     Past Medical History:    BPH (benign prostatic hyperplasia)    Cancer (HCC)    prostate    Essential hypertension    High blood pressure              History reviewed. No pertinent surgical history.             Social History     Socioeconomic History    Marital status:    Tobacco Use    Smoking status: Never    Smokeless tobacco: Never   Vaping Use    Vaping status: Never Used   Substance and Sexual Activity    Alcohol use: Never    Drug use: Never     Social Drivers of Health     Food Insecurity: No Food Insecurity (10/14/2024)    Food Insecurity     Food Insecurity: Never true   Transportation Needs: No Transportation Needs (10/14/2024)    Transportation Needs     Lack of Transportation: No   Housing Stability: Low Risk  (10/14/2024)    Housing Stability     Housing Instability: No                  Physical Exam     ED Triage Vitals [10/14/24 1326]   BP (!) 197/101   Pulse 63   Resp 17   Temp 97.9 °F (36.6 °C)   Temp src Oral   SpO2 97 %   O2 Device None (Room air)       Current Vitals:   Vital Signs  BP: 141/68  Pulse: 84  Resp: 18  Temp: 99 °F  (37.2 °C)  Temp src: Oral  MAP (mmHg): 90    Oxygen Therapy  SpO2: 95 %  O2 Device: None (Room air)  Pulse Oximetry Type: Continuous  Oximetry Probe Site Changed: No  Pulse Ox Probe Location: Right hand        Physical Exam  General: Patient is resting comfortably in no acute distress  HEENT: Normal cephalic atraumatic.  Nonicteric sclera.  Moist mucous membranes.  No meningismus.  No adenopathy  Lungs: No tachypnea.  Lungs clear to auscultation bilaterally without rales/rhonchi.  Equal breath sounds bilaterally  Cardiac: No tachycardia.  No murmurs.  Regular rate and rhythm.  Abdomen: Soft and nontender throughout.  No rebound or guarding  Extremities: No clubbing/cyanosis/edema.  Skin: No rashes, no pallor  Neuro: Awake oriented ×3.  Nonfocal.  Good strength throughout    ED Course     Labs Reviewed   CBC WITH DIFFERENTIAL WITH PLATELET - Abnormal; Notable for the following components:       Result Value    HGB 12.2 (*)     HCT 34.9 (*)     All other components within normal limits   COMP METABOLIC PANEL (14) - Abnormal; Notable for the following components:    Glucose 129 (*)     Potassium 3.4 (*)     ALT 7 (*)     All other components within normal limits   TROPONIN I HIGH SENSITIVITY - Normal   TSH W REFLEX TO FREE T4 - Normal   TROPONIN I HIGH SENSITIVITY - Normal   RAINBOW DRAW LAVENDER   RAINBOW DRAW LIGHT GREEN   RAINBOW DRAW BLUE     EKG    Rate, intervals and axes as noted on EKG Report.  Rate: 63  Rhythm: Sinus Rhythm  Reading: Poor R wave progression.  No acute ST-T wave changes.  Axis/intervals are noted.  Otherwise, agree with EKG report                Chest x-ray: I personally reviewed the films and my independent interpertaion showed no acute pathology.  Official report reviewed    CT brain: No acute pathology.  Official report reviewed troponin on arrival was 12.  Blood work reviewed.  Creatinine normal.       MDM      Patient is a 84-year-old gentleman with known coronary disease, high blood  pressure presents with hypertension, chest tightness.  Symptoms are consistent with hypertensive urgency/emergency.  Patient was given sublingual nitro with improvement of his chest pain.  He was put on a nitro drip.  Blood pressure however remained elevated.  He was treated with 2 doses of IV hydralazine and eventual improvement.  Will admit for serial cardiac enzymes, cardiology evaluation.  Discussed with hospitalist as well as [facility].  Discussed with patient and family    Admission disposition: 10/14/2024  3:50 PM           MDM    Disposition and Plan     Clinical Impression:  1. Chest pain of uncertain etiology    2. Hypertensive emergency         Disposition:  Admit  10/14/2024  3:50 pm    Follow-up:  No follow-up provider specified.        Medications Prescribed:  Current Discharge Medication List              Supplementary Documentation:         Hospital Problems       Present on Admission             ICD-10-CM Noted POA    * (Principal) Chest pain of uncertain etiology R07.9 10/14/2024 Unknown    Hyperglycemia R73.9 10/14/2024 Yes    Hypertensive emergency I16.1 10/14/2024 Unknown    Hypokalemia E87.6 10/14/2024 Yes

## 2024-10-15 NOTE — CM/SW NOTE
Anticipated therapy need for pt at DC is HH.    SW sent referral in [facility]. Awaiting responses. Will provide pt/family with options list when available.     to remain available for support and/or discharge planning.    YUE Mckay  Discharge Planner  [phone]

## 2024-10-15 NOTE — OCCUPATIONAL THERAPY NOTE
OCCUPATIONAL THERAPY EVALUATION - INPATIENT    Room Number: 2609/2609-A  Evaluation Date: 10/15/2024     Type of Evaluation: Initial  Presenting Problem: chest pain    Physician Order: IP Consult to Occupational Therapy  Reason for Therapy:  ADL/IADL Dysfunction and Discharge Planning    OCCUPATIONAL THERAPY ASSESSMENT   Patient is a 84 year old male admitted on 10/14/2024 with Presenting Problem: chest pain. Co-Morbidities : CAD, MI, mets prostate CA  Patient is currently functioning at baseline with toileting, upper body dressing, lower body dressing, grooming, bed mobility, transfers, static sitting balance, dynamic sitting balance, static standing balance, dynamic standing balance, maintaining seated position, and functional standing tolerance.  Prior to admission, patient's baseline is Mod I at ILF.  Patient met all OT goals at sup level.  Patient reports no further questions/concerns at this time.       Recommendations for nursing staff:   Transfers: Sup  Toileting location: Toilet    EVALUATION SESSION:  Patient at start of session: supine in bed for session    FUNCTIONAL TRANSFER ASSESSMENT  Sit to Stand: Edge of Bed  Edge of Bed: Supervision  Toilet Transfer: Supervision    BED MOBILITY  Rolling: Supervision  Supine to Sit : Supervision  Scooting: Sup to EOB    BALANCE ASSESSMENT  Static Sitting: Supervision  Static Standing: Supervision (and sup to amb in room and hallway and bathroom)    FUNCTIONAL ADL ASSESSMENT  UB Dressing Seated: Supervision (for coat)  LB Dressing Seated: Supervision (for socks)  LB Dressing Standing: Supervision (to pull up pants)  Toileting Seated: Supervision (at std toilet)    ACTIVITY TOLERANCE: vitals stable                         O2 SATURATIONS       COGNITION  Overall Cognitive Status:  WFL - within functional limits    COGNITION ASSESSMENTS     Upper Extremity:   ROM: within functional limits   Strength: is within functional limits   Coordination:  Gross motor: WNL  Fine  motor: WNL  Sensation: Light touch:  intact    EDUCATION PROVIDED  Patient Education : Role of Occupational Therapy; Plan of Care  Patient's Response to Education: Verbalized Understanding; Returned Demonstration    Equipment used: RW  Demonstrates functional use    Therapist comments: Pt reported fatigue at home, pt educated on work simplification and energy conservation for at home to assist with independence with fatigue at home.      Patient End of Session: Up in chair;Needs met;Call light within reach;All patient questions and concerns addressed;Alarm set    OCCUPATIONAL PROFILE    HOME SITUATION  Type of Home: Apartment  Home Layout: One level  Lives With: Spouse    Toilet and Equipment: Standard height toilet  Shower/Tub and Equipment: Walk-in shower  Other Equipment: None          Drives: No  Patient Regularly Uses: Cane;Rolling walker ([facility] the [facility]. wife assist as neded at home e.g. tying shoes. walks with mod I PTA)    Prior Level of Function: Pt typically independent with ADLs and mobility. Pt does not use AD.    SUBJECTIVE  Pt stated, \"I am doing better.\"    PAIN ASSESSMENT  Ratin  Location: no pain at this time       OBJECTIVE     Fall Risk: High fall risk    WEIGHT BEARING RESTRICTION       AM-PAC ‘6-Clicks’ Inpatient Daily Activity Short Form  -   Putting on and taking off regular lower body clothing?: A Little  -   Bathing (including washing, rinsing, drying)?: A Little  -   Toileting, which includes using toilet, bedpan or urinal? : A Little  -   Putting on and taking off regular upper body clothing?: A Little  -   Taking care of personal grooming such as brushing teeth?: A Little  -   Eating meals?: A Little    AM-PAC Score:  Score: 18  Approx Degree of Impairment: 46.65%  Standardized Score (AM-PAC Scale): 38.66    ADDITIONAL TESTS     NEUROLOGICAL FINDINGS      PLAN   Patient has been evaluated and presents with no skilled Occupational Therapy needs at this time.   Patient discharged from Occupational Therapy services.  Please re-order if a new functional limitation presents during this admission.         Patient Evaluation Complexity Level:   Occupational Profile/Medical History LOW - Brief history including review of medical or therapy records    Specific performance deficits impacting engagement in ADL/IADL LOW  1 - 3 performance deficits    Client Assessment/Performance Deficits LOW - No comorbidities nor modifications of tasks    Clinical Decision Making LOW - Analysis of occupational profile, problem-focused assessments, limited treatment options    Overall Complexity LOW     OT Session Time: 25 minutes  Self-Care Home Management: 15 minutes  Therapeutic Activity: 0 minutes  Neuromuscular Re-education: 0 minutes  Therapeutic Exercise: 0 minutes  Cognitive Skills: 0 minutes  Sensory Integrative: 0 minutes  Orthotic Management and Trainin minutes  Can add/delete any of these

## 2024-10-15 NOTE — PROGRESS NOTES
[facility]   part of [facility]     Hospitalist Progress Note     Shreya Avelar Patient Status:  Inpatient    1940 MRN [id number]   [facility] 2NE-A Attending Mickey Queen MD   Hosp Day # 1 PCP LISA Mohamud     Chief Complaint: chest pain    Subjective:     Patient Mandarin speaking. Son at bedside translating. Currently chest pain free with no complaints. Son reports pt often misses doses of his BP meds at home. Has swings in BP's that sometimes improve spontaneously without treating with medications. Discussed importance of medication compliance. Updated on plan of care and answered all questions    Objective:    Review of Systems:   A comprehensive review of systems was completed; pertinent positive and negatives stated in subjective.    Vital signs:  Temp:  [97.9 °F (36.6 °C)-99 °F (37.2 °C)] 98.1 °F (36.7 °C)  Pulse:  [56-85] 78  Resp:  [13-21] 20  BP: (141-219)/() 150/66  SpO2:  [94 %-99 %] 96 %    Physical Exam:    General: No acute distress  Respiratory: No wheezes, no rhonchi  Cardiovascular: S1, S2, regular rate and rhythm  Abdomen: Soft, Non-tender, non-distended, positive bowel sounds  Neuro: No new focal deficits.   Extremities: No edema    Diagnostic Data:    Labs:  Recent Labs   Lab 10/14/24  1326 10/15/24  0546   WBC 7.3 7.7   HGB 12.2* 11.0*   MCV 90.2 90.5   .0 202.0       Recent Labs   Lab 10/14/24  1326 10/15/24  0546   * 117*   BUN 17 16   CREATSERUM 0.93 0.75   CA 9.1 9.1   ALB 4.3 3.6    143   K 3.4* 3.3*  3.3*    111   CO2 26.0 26.0   ALKPHO 94 81   AST 24 24   ALT 7* <7*   BILT 0.3 0.5   TP 6.9 5.9       Estimated Creatinine Clearance: 66.2 mL/min (based on SCr of 0.75 mg/dL).    Recent Labs   Lab 10/14/24  1326 10/14/24  1733   TROPHS 12 12       No results for input(s): \"PTP\", \"INR\" in the last 168 hours.    Lab Results   Component Value Date    TSH 1.469 10/14/2024             Microbiology    No results found for  this visit on 10/14/24.      Imaging: Reviewed in Epic.    Medications:    losartan  50 mg Oral Daily    enoxaparin  40 mg Subcutaneous Daily    amLODIPine  5 mg Oral Daily    Abiraterone Acetate  1,000 mg Oral Daily    metoprolol succinate ER  50 mg Oral Daily    predniSONE  5 mg Oral Daily       Assessment & Plan:      #Hypertensive urgency   #Chest pain, likely d/t above  - EKG negative for acute ischemic changes  - serial troponin negative  - telemetry  - echo ordered  - started on amlodipine 5mg, metoprolol 50mg  - add losartan 50mg daily  - discontinue nitroglycerin drip  - Cardiology following > possible stress testing tomorrow pending BP management      #Altered mental status reported by family   - CT brain without acute findings   - resolved     #hypokalemia  - replace  - follow BMP     #CAD with hx of MI  - BB  - unclear why he isn't on anti-platelet medications    # Prostate cancer  - PTA abiraterone, prednisone  - oncology following    #Parkinson's disease  - not on meds    #Obesity  - Body mass index is 30.02 kg/m².    #Chronic anemia  - Hgb at baseline        Brittani Acuna DO    Supplementary Documentation:     Quality:  DVT Mechanical Prophylaxis:     Early ambuation  DVT Pharmacologic Prophylaxis   Medication    enoxaparin (Lovenox) 40 MG/0.4ML SUBQ injection 40 mg                Code Status: DNAR/Full Treatment  Corrigan: No urinary catheter in place  Corrigan Duration (in days):   Central line:    MAYE: 10/16/2024    Discharge is dependent on: clinical state, cardiology recs  At this point Mr. Avelar is expected to be discharge to: home    The 21st Century Cures Act makes medical notes like these available to patients in the interest of transparency. Please be advised this is a medical document. Medical documents are intended to carry relevant information, facts as evident, and the clinical opinion of the practitioner. The medical note is intended as peer to peer communication and may appear blunt or  direct. It is written in medical language and may contain abbreviations or verbiage that are unfamiliar.

## 2024-10-16 ENCOUNTER — APPOINTMENT (OUTPATIENT)
Dept: CV DIAGNOSTICS | Facility: HOSPITAL | Age: 84
End: 2024-10-16
Payer: MEDICAID

## 2024-10-16 VITALS
HEIGHT: 66 IN | TEMPERATURE: 98 F | OXYGEN SATURATION: 95 % | DIASTOLIC BLOOD PRESSURE: 68 MMHG | RESPIRATION RATE: 18 BRPM | WEIGHT: 186 LBS | BODY MASS INDEX: 29.89 KG/M2 | SYSTOLIC BLOOD PRESSURE: 159 MMHG | HEART RATE: 77 BPM

## 2024-10-16 LAB — POTASSIUM SERPL-SCNC: 3.5 MMOL/L (ref 3.5–5.1)

## 2024-10-16 PROCEDURE — 99239 HOSP IP/OBS DSCHRG MGMT >30: CPT | Performed by: HOSPITALIST

## 2024-10-16 PROCEDURE — 93017 CV STRESS TEST TRACING ONLY: CPT

## 2024-10-16 PROCEDURE — 78452 HT MUSCLE IMAGE SPECT MULT: CPT

## 2024-10-16 PROCEDURE — 93018 CV STRESS TEST I&R ONLY: CPT

## 2024-10-16 RX ORDER — LOSARTAN POTASSIUM 50 MG/1
50 TABLET ORAL DAILY
Qty: 30 TABLET | Refills: 0 | Status: SHIPPED | OUTPATIENT
Start: 2024-10-16 | End: 2024-11-15

## 2024-10-16 RX ORDER — REGADENOSON 0.08 MG/ML
INJECTION, SOLUTION INTRAVENOUS
Status: COMPLETED
Start: 2024-10-16 | End: 2024-10-16

## 2024-10-16 RX ORDER — AMLODIPINE BESYLATE 5 MG/1
5 TABLET ORAL DAILY
Qty: 30 TABLET | Refills: 0 | Status: SHIPPED | OUTPATIENT
Start: 2024-10-16 | End: 2024-11-15

## 2024-10-16 NOTE — PROGRESS NOTES
[facility]   part of [facility]     Hospitalist Progress Note     Shreya Avelar Patient Status:  Inpatient    1940 MRN [id number]   [facility] 2NE-A Attending Mickey Queen MD   Hosp Day # 2 PCP LISA Mohamud     Chief Complaint: chest pain    Subjective:     Patient seen and examined. Phone  used. Denies CP/SOB.    Objective:    Review of Systems:   A comprehensive review of systems was completed; pertinent positive and negatives stated in subjective.    Vital signs:  Temp:  [97 °F (36.1 °C)-99 °F (37.2 °C)] 98.3 °F (36.8 °C)  Pulse:  [70-83] 77  Resp:  [19-20] 20  BP: (107-181)/(48-75) 159/75  SpO2:  [90 %-98 %] 95 %    Physical Exam:    General: No acute distress  Respiratory: No wheezes, no rhonchi  Cardiovascular: S1, S2, regular rate and rhythm  Abdomen: Soft, Non-tender, non-distended, positive bowel sounds  Neuro: No new focal deficits.   Extremities: No edema    Diagnostic Data:    Labs:  Recent Labs   Lab 10/14/24  1326 10/15/24  0546   WBC 7.3 7.7   HGB 12.2* 11.0*   MCV 90.2 90.5   .0 202.0       Recent Labs   Lab 10/14/24  1326 10/15/24  0546 10/16/24  0614   * 117*  --    BUN 17 16  --    CREATSERUM 0.93 0.75  --    CA 9.1 9.1  --    ALB 4.3 3.6  --     143  --    K 3.4* 3.3*  3.3* 3.5    111  --    CO2 26.0 26.0  --    ALKPHO 94 81  --    AST 24 24  --    ALT 7* <7*  --    BILT 0.3 0.5  --    TP 6.9 5.9  --        Estimated Creatinine Clearance: 66.2 mL/min (based on SCr of 0.75 mg/dL).    Recent Labs   Lab 10/14/24  1326 10/14/24  1733   TROPHS 12 12       No results for input(s): \"PTP\", \"INR\" in the last 168 hours.                 Microbiology    No results found for this visit on 10/14/24.      Imaging: Reviewed in Epic.    Medications:    losartan  50 mg Oral Daily    enoxaparin  40 mg Subcutaneous Daily    amLODIPine  5 mg Oral Daily    [Held by provider] Abiraterone Acetate  1,000 mg Oral Daily    metoprolol succinate  ER  50 mg Oral Daily    predniSONE  5 mg Oral Daily       Assessment & Plan:      #Hypertensive urgency   #Chest pain, likely d/t above  - EKG negative for acute ischemic changes  - serial troponin negative  - telemetry  - echo with normal EF, no RWMA  - Continue amlodipine 5mg, metoprolol 50mg  - losartan 50mg daily  - Cardiology following > stress test today      #Altered mental status reported by family   - CT brain without acute findings   - resolved     #hypokalemia  - replace PRN  - follow BMP     #CAD with hx of MI  - BB  - unclear why he isn't on anti-platelet medications    # Prostate cancer  - PTA abiraterone, prednisone  - oncology following    #Parkinson's disease  - not on meds    #Obesity  - Body mass index is 30.02 kg/m².    #Chronic anemia  - Hgb at baseline    #Disposition  -DC planning if stress test negative    Memo Rankin DO    Supplementary Documentation:     Quality:  DVT Mechanical Prophylaxis:     Early ambuation  DVT Pharmacologic Prophylaxis   Medication    enoxaparin (Lovenox) 40 MG/0.4ML SUBQ injection 40 mg                Code Status: DNAR/Full Treatment  Corrigan: No urinary catheter in place  Corrigan Duration (in days):   Central line:    MAYE: 10/16/2024    Discharge is dependent on: clinical state, cardiology recs  At this point Mr. Avelar is expected to be discharge to: home    The 21st Century Cures Act makes medical notes like these available to patients in the interest of transparency. Please be advised this is a medical document. Medical documents are intended to carry relevant information, facts as evident, and the clinical opinion of the practitioner. The medical note is intended as peer to peer communication and may appear blunt or direct. It is written in medical language and may contain abbreviations or verbiage that are unfamiliar.

## 2024-10-16 NOTE — DISCHARGE SUMMARY
[place] HOSPITALIST  DISCHARGE SUMMARY     Shreya Avelar Patient Status:  Inpatient    1940 MRN [id number]   Location [facility] 2NE-A Attending Memo Rankin,    Hosp Day # 2 PCP LISA Mohamud     Date of Admission: 10/14/2024  Date of Discharge:   10/16/2024    Discharge Disposition: Home    Discharge Diagnosis:  Hypertensive urgency  Chest pain  Hypokalemia  CAD with prior MI  Prostate cancer  Parkinson's disease   Obesity  Chronic anemia     History of Present Illness: Shreya Avelar is a 84 year old male with history of CAD s/p PCI, HTN, prostate cancer, Parkinson's disease presented with chest pain, nausea, AMS.      Patient is Mandarin speaking and his son helped translate and also provided history.  Patient has had elevated blood pressures up into the systolics of 200s for weeks to months.  Primary care physician recommended for better blood pressure control about 2 weeks ago.  It is not clear if any medication changes were made at the time.  Patient has been reporting intermittent chest pain for months.  Current episode started at midnight on day of admission.  Patient reports a substernal tightness/pressure that was constant.  Blood pressure is elevated up to 200 and persisted so family decided to bring the patient to the ER for evaluation.  Patient reports associated nausea and chills.  Denies fevers, dysuria, hematuria, cough, weakness, tingling, headache.     BP up to 207/77 in the ED. EKG with NSR and no significant ST-T wave changes c/f ACS. Troponin x 1 negative. Patient was given FD aspirin, IV hydralazine 10 mg, SL nitroglycerin and nitroglycerin drip. Cardiology consulted.     Brief Synopsis:     #Hypertensive urgency   #Chest pain, likely d/t above  - EKG negative for acute ischemic changes  - serial troponin negative  - telemetry  - echo with normal EF, no RWMA  - Nuclear stress test negative   - Continue amlodipine 5mg, metoprolol 50mg  - losartan 50mg daily  - Cardiology  following      #Altered mental status reported by family   - CT brain without acute findings   - Resolved     #hypokalemia  - replace PRN  - follow BMP     #CAD with hx of MI  - BB  - unclear why he isn't on anti-platelet medications     # Prostate cancer  - PTA abiraterone, prednisone  - oncology following     #Parkinson's disease  - not on meds     #Obesity  - Body mass index is 30.02 kg/m².     #Chronic anemia  - Hgb at baseline     #Disposition  - Stable for DC home    Lace+ Score: 66  59-90 High Risk  29-58 Medium Risk  0-28   Low Risk       TCM Follow-Up Recommendation:  LACE > 58: High Risk of readmission after discharge from the hospital.      Procedures during hospitalization:   None    Incidental or significant findings and recommendations (brief descriptions):  None    Lab/Test results pending at Discharge:   None    Consultants:  Cardiology    Discharge Medication List:     Medication List        START taking these medications      amLODIPine 5 MG Tabs  Commonly known as: Norvasc  Take 1 tablet (5 mg total) by mouth daily.     losartan 50 MG Tabs  Commonly known as: Cozaar  Take 1 tablet (50 mg total) by mouth daily.            CONTINUE taking these medications      Abiraterone Acetate 250 MG Tabs     metoprolol succinate ER 50 MG Tb24  Commonly known as: Toprol XL     predniSONE 5 MG Tabs  Commonly known as: Deltasone     zolpidem 10 MG Tabs  Commonly known as: Ambien            STOP taking these medications      NIFEdipine ER 30 MG Tb24  Commonly known as: Adalat CC     TELMISARTAN OR               Where to Get Your Medications        These medications were sent to [facility] - [address], [address] [phone], [phone]  [address] [place] [address], [address], [facility] [address]      Phone: [phone]   amLODIPine 5 MG Tabs  losartan 50 MG Tabs            [facility] reviewed: N/A    Follow-up appointment:   Dimitrios Weber, APRN  [address] [facility]  [address]  [facility] [address] [phone]    Schedule an appointment as soon as possible for a visit in 1 week(s)      Jose Bernal MD   [facility] [address] [phone]    Schedule an appointment as soon as possible for a visit      Appointments for Next 30 Days 10/16/2024 - 11/15/2024      None            -----------------------------------------------------------------------------------------------  PATIENT DISCHARGE INSTRUCTIONS: See electronic chart    Memo Rankin DO    Total time spent on discharge plannin minutes     The  Cures Act makes medical notes like these available to patients in the interest of transparency. Please be advised this is a medical document. Medical documents are intended to carry relevant information, facts as evident, and the clinical opinion of the practitioner. The medical note is intended as peer to peer communication and may appear blunt or direct. It is written in medical language and may contain abbreviations or verbiage that are unfamiliar.

## 2024-10-16 NOTE — PLAN OF CARE
Patient is admitted for  nausea and chest pain . Pt is AOX4.  Mandarin speaking. , used   for communication. VSS, afebrile and denies any pain. SpO2 maintained on RA. . SOB with ambulation. Denies any cough. Tele-NSR. Lovenox. Cardiac /treadmill  diet. Denies any v/d. Pt c/o mild nausea, refused nausea med. Voids. Up with SBA/walker. PO Prednisone.  Stress test done. Will continue to monitor. Pt is updated with plan of care.        Problem: CARDIOVASCULAR - ADULT  Goal: Maintains optimal cardiac output and hemodynamic stability  Description: INTERVENTIONS:  - Monitor vital signs, rhythm, and trends  - Monitor for bleeding, hypotension and signs of decreased cardiac output  - Evaluate effectiveness of vasoactive medications to optimize hemodynamic stability  - Monitor arterial and/or venous puncture sites for bleeding and/or hematoma  - Assess quality of pulses, skin color and temperature  - Assess for signs of decreased coronary artery perfusion - ex. Angina  - Evaluate fluid balance, assess for edema, trend weights  Outcome: Progressing  Goal: Absence of cardiac arrhythmias or at baseline  Description: INTERVENTIONS:  - Continuous cardiac monitoring, monitor vital signs, obtain 12 lead EKG if indicated  - Evaluate effectiveness of antiarrhythmic and heart rate control medications as ordered  - Initiate emergency measures for life threatening arrhythmias  - Monitor electrolytes and administer replacement therapy as ordered  Outcome: Progressing     Problem: Patient/Family Goals  Goal: Patient/Family Long Term Goal  Description: Patient's Long Term Goal: stay out of hospital    Interventions:  - medication compliance  -follow up apts   - See additional Care Plan goals for specific interventions  Outcome: Progressing  Goal: Patient/Family Short Term Goal  Description: Patient's Short Term Goal: discharge home    Interventions:   -BP monitoring   - See additional Care Plan goals for specific  interventions  Outcome: Progressing

## 2024-10-16 NOTE — PROGRESS NOTES
Progress Note  Shreya Avelar Patient Status:  Inpatient    1940 MRN [id number]   Location [facility] 2NE-A Attending Mickey Queen MD   Hosp Day # 2 PCP LISA Mohamud     Subjective:  Awake. Son on the phone translating for the patient.  No acute issues overnight.  Denies chest pain or sob.   Reviewed echo findings with the son and the patient.       Objective:  /57 (BP Location: Right arm)   Pulse 83   Temp 97.9 °F (36.6 °C) (Oral)   Resp 20   Ht 5' 6\" (1.676 m)   Wt 186 lb (84.4 kg)   SpO2 95%   BMI 30.02 kg/m²     Telemetry: SR       Intake/Output:    Intake/Output Summary (Last 24 hours) at 10/16/2024 1111  Last data filed at 10/16/2024 0823  Gross per 24 hour   Intake 120 ml   Output 270 ml   Net -150 ml       Last 3 Weights   10/14/24 1749 186 lb (84.4 kg)   10/14/24 1326 186 lb (84.4 kg)   09/10/23 0924 185 lb (83.9 kg)       Labs:  Recent Labs   Lab 10/14/24  1326 10/15/24  0546 10/16/24  0614   * 117*  --    BUN 17 16  --    CREATSERUM 0.93 0.75  --    EGFRCR 81 89  --    CA 9.1 9.1  --     143  --    K 3.4* 3.3*  3.3* 3.5    111  --    CO2 26.0 26.0  --      Recent Labs   Lab 10/14/24  1326 10/15/24  0546   RBC 3.87 3.48*   HGB 12.2* 11.0*   HCT 34.9* 31.5*   MCV 90.2 90.5   MCH 31.5 31.6   MCHC 35.0 34.9   RDW 12.6 12.6   NEPRELIM 5.04  --    WBC 7.3 7.7   .0 202.0         Recent Labs   Lab 10/14/24  1326 10/14/24  1733   TROPHS 12 12   EKG:  10/14/24:   SR 67 BPM, QRS;  80 ms, QTc: 411 ms, GA: 182 ms.     Echo:  10/15/24:    Conclusions:     1. Procedure narrative: Transthoracic echocardiography for ventricular      function evaluation and assessment of valvular function. Image quality      was suboptimal.   2. Left ventricle: The cavity size was normal. Wall thickness was normal.      Systolic function was normal. The estimated ejection fraction was 60-65%,      by visual assessment. No diagnostic evidence for regional wall motion       abnormalities. Left ventricular diastolic function parameters were normal      for the patient's age.   3. Left atrium: The left atrial volume was normal.   4. Pulmonary arteries: Systolic pressure could not be accurately estimated.   Impressions:  No previous study was available for comparison.     Review of Systems:   Constitutional: No fevers, chills, fatigue or night sweats.  ENT: No mouth pain, neck pain, running nose, headaches or swollen glands.  Skin: No rashes, pruritus or skin changes,  Respiratory: Denies cough, wheezing or shortness of breath.  CV: Denies chest pain, palpitations, orthopnea, PND or dizziness.  Musculoskeletal: No joint pain, stiffness or swelling.  GI: No nausea, vomiting or diarrhea. No blood in stools.  Neurologic: No seizures, tremors, weakness or numbness.     Physical Exam:  Gen: alert, oriented x 3, NAD  Heent: pupils equal, reactive. Mucous membranes moist.   Neck: no jvd  Cardiac: regular rate and rhythm, normal S1,S2, no murmur, gallop or rub.   Lungs: CTA  Abd: soft, NT/ND +bs  Ext: no edema  Skin: Warm, dry  Neuro: No focal deficits      Medications:     losartan  50 mg Oral Daily    enoxaparin  40 mg Subcutaneous Daily    amLODIPine  5 mg Oral Daily    [Held by provider] Abiraterone Acetate  1,000 mg Oral Daily    metoprolol succinate ER  50 mg Oral Daily    predniSONE  5 mg Oral Daily       Assessment:  Hypertensive urgency- Initially 207/77.  Bps improved with addition of amlodipine   Losartan 50 mg daily, Toprol xl 50 mg daily, and amlodipine 5 mg daily   Chest pain   No further anginal symptoms.   Echo with normal LVEF 60-65%; no wall motion or significant valvular abnormalities   3.    History of CAD s/p PCI to left circumflex  Asa; not on statin.   4.    Metastatic prostate cancer - oncology following   prostate biopsy 12/1/2021 with grade gorup 5 prostate cancer in 6 of 12 scores, Yohana 9   2/2023 bone scan with evidence of bone metastases. Started abiraterone +  prednisone.  PSAs increasing, 0.61 in 5/24 > 1.8 in 8/24 > 26.83 on admission     Plan:  BP stable. Continue same BP regimen for now.   Continue to monitor BP closely.   Echo with normal LVEF   Stress test today for evaluation of underlying CAD   Anticipate discharge later today, if stress test normal.     Plan of care discussed with patient's son and RN.      Renae Melania, LISA  10/16/2024  11:11 AM  [phone]      I have personally seen and examined patient.   I have independently formulated assessment and plan  I agree with the AP note    1, Hypertensive urgency  2. Atypical chest pain  3. Metastatic prostate cancer  4. History of CAD with prior PCI    BP improved. Cont with current medications.   Plan for stress test today    Will cont to follow    Thank you for the opportunity to participate in the care of your patient.     Amina Singh MD  Interventional Cardiologist  [facility]

## 2024-10-16 NOTE — CM/SW NOTE
10/16/24 1200   CM/SW Referral Data   Referral Source Social Work (self-referral)   Reason for Referral Discharge planning   Informant EMR;Clinical Staff Member;Son   Medical Hx   Does patient have an established PCP? Yes   Patient Info   Patient's Current Mental Status at Time of Assessment Alert;Oriented   Patient's Home Environment Condo/Apt with elevator   Patient lives with Spouse/Significant other   Discharge Needs   Anticipated D/C needs Home health care   Choice of Post-Acute Provider   Informed patient of right to choose their preferred provider Yes   List of appropriate post-acute services provided to patient/family with quality data Yes   Patient/family choice [facility]   Information given to Son     HOME SITUATION per PT eval  Type of Home: Apartment  Home Layout: One level           Stairs to Bedroom: 0         Lives With: Spouse    Drives: No   Patient Regularly Uses: Cane;Rolling walker (gos to the senior center regularly. wife assist as neded at home e.g. tying shoes. walks with mod I PTA)      Prior Level of [place] per PT eval: Prior to admission, patient's baseline is mod I/SBA in his bed mobilities, transfers and gt. Amb with RW and cane PTA. Wife assist with dressing, meals complex needs. Goes to senior activities regularly.    Patient is an 85 y/o male admitted due to chest pain. Anticipated therapy need for pt at DC is . KENYETTA sent referral in [facility] and obtained options list. Only accepting agency is [facility].    KENYETTA called pt's son, Darion ([phone]), to discuss DC plan. Darion notified KENYETTA that pt's address on his EMR is actually his mailing address but his residing address is different. Darion stated [facility]  with his wife on [address] [place] [address]. Darion stated these are senior living apartment and they have elevator access in the building. Darion reports pt owns canes, a rolling walker, and a Rollator.    KENYETTA  discussed HH services for pt at [place]. KENYETTA notified Darion that [facility] was the only accepting agency. Darion agreeable with HH services and utilizing [facility]. Darion also notified KENYETTA that when pt is cleared for CT he will transport pt back to his apartment. Darion thanked KENYETTA and denied having any further questions at this time.    KENYETTA reserved [facility] in [place] and sent them a message notifying them of pt's actual residing address. Eze Madrid with [facility] responded confirming they can still service pt at that address.     to remain available for support and/or discharge planning.    YUE Mckay  Discharge Planner  [phone]

## 2024-10-16 NOTE — PLAN OF CARE
A&Ox4. Mandarin speaking, uses  and son to translate. Pt denies any pain. RA, clear/diminshed lung sounds. NSR on tele, denies any chest pain or shortness of breath. Continent of bowel and bladder. Uses urinal at bedside.     Bed is locked and in low position. Call light and personal items within reach.  Pt updated with plan of care.     Approx 2053 paged Chasidy George APN regarding /61 & 175/84. 1x dose of Hydralazine given.     Problem: CARDIOVASCULAR - ADULT  Goal: Maintains optimal cardiac output and hemodynamic stability  Description: INTERVENTIONS:  - Monitor vital signs, rhythm, and trends  - Monitor for bleeding, hypotension and signs of decreased cardiac output  - Evaluate effectiveness of vasoactive medications to optimize hemodynamic stability  - Monitor arterial and/or venous puncture sites for bleeding and/or hematoma  - Assess quality of pulses, skin color and temperature  - Assess for signs of decreased coronary artery perfusion - ex. Angina  - Evaluate fluid balance, assess for edema, trend weights  10/16/2024 0217 by Taya Martinez RN  Outcome: Progressing  10/15/2024 2346 by Taya Martinez RN  Outcome: Progressing  Goal: Absence of cardiac arrhythmias or at baseline  Description: INTERVENTIONS:  - Continuous cardiac monitoring, monitor vital signs, obtain 12 lead EKG if indicated  - Evaluate effectiveness of antiarrhythmic and heart rate control medications as ordered  - Initiate emergency measures for life threatening arrhythmias  - Monitor electrolytes and administer replacement therapy as ordered  10/16/2024 0217 by Taya Martinez, RN  Outcome: Progressing  10/15/2024 2346 by Taya Martinez RN  Outcome: Progressing     Problem: Patient/Family Goals  Goal: Patient/Family Long Term Goal  Description: Patient's Long Term Goal: stay out of hospital    Interventions:  - medication compliance  -follow up apts   - See additional Care Plan goals for specific  interventions  10/16/2024 0217 by Taya Martinez, RN  Outcome: Progressing  10/15/2024 2346 by Taya Martinez RN  Outcome: Progressing  Goal: Patient/Family Short Term Goal  Description: Patient's Short Term Goal: discharge home    Interventions:   -BP monitoring   - See additional Care Plan goals for specific interventions  10/16/2024 0217 by Taya Martinez, RN  Outcome: Progressing  10/15/2024 2346 by Taya Martinez, RN  Outcome: Progressing

## 2024-10-16 NOTE — DISCHARGE INSTRUCTIONS
[facility], [address], [address]  [place] [address]  Phone: [phone]  Fax: [phone]

## 2024-10-16 NOTE — PLAN OF CARE
Discharged  home via wheelchair  Accompanied by Support staff   Belongings taken by patient/family  PIV removed  Tele box removed & placed in the drawer  Discharge Navigator completed  Discharge instructions reviewed with patient a bedside  All questions & concerns addressed at his time.           Problem: CARDIOVASCULAR - ADULT  Goal: Maintains optimal cardiac output and hemodynamic stability  Description: INTERVENTIONS:  - Monitor vital signs, rhythm, and trends  - Monitor for bleeding, hypotension and signs of decreased cardiac output  - Evaluate effectiveness of vasoactive medications to optimize hemodynamic stability  - Monitor arterial and/or venous puncture sites for bleeding and/or hematoma  - Assess quality of pulses, skin color and temperature  - Assess for signs of decreased coronary artery perfusion - ex. Angina  - Evaluate fluid balance, assess for edema, trend weights  Outcome: Progressing  Goal: Absence of cardiac arrhythmias or at baseline  Description: INTERVENTIONS:  - Continuous cardiac monitoring, monitor vital signs, obtain 12 lead EKG if indicated  - Evaluate effectiveness of antiarrhythmic and heart rate control medications as ordered  - Initiate emergency measures for life threatening arrhythmias  - Monitor electrolytes and administer replacement therapy as ordered  Outcome: Progressing     Problem: Patient/Family Goals  Goal: Patient/Family Long Term Goal  Description: Patient's Long Term Goal: stay out of hospital    Interventions:  - medication compliance  -follow up apts   - See additional Care Plan goals for specific interventions  Outcome: Progressing  Goal: Patient/Family Short Term Goal  Description: Patient's Short Term Goal: discharge home    Interventions:   -BP monitoring   - See additional Care Plan goals for specific interventions  Outcome: Progressing

## 2024-10-16 NOTE — PLAN OF CARE
Discussed with radiology. Cardiac nuc negative for ischemia with LVEF of 75%. Reasonable for patient to discharge from a cardiac perspective.

## 2024-10-16 NOTE — PROGRESS NOTES
Here for Lexiscan along with son as . Had minimal nausea and vomiting with bile colored emesis with relief.  Offered Aminophylline however son refused it.  Awaiting post images.

## 2024-10-17 NOTE — PAYOR COMM NOTE
Discharge Notification    Patient Name: CAITY EPPS  Payor: [employer]  Subscriber #: [id number]  Authorization Number: [id number]  Admit Date/Time: 10/14/2024 1:12 PM  Discharge Date/Time: 10/16/2024 6:34 PM